# Patient Record
Sex: FEMALE | Race: WHITE | NOT HISPANIC OR LATINO | Employment: OTHER | URBAN - NONMETROPOLITAN AREA
[De-identification: names, ages, dates, MRNs, and addresses within clinical notes are randomized per-mention and may not be internally consistent; named-entity substitution may affect disease eponyms.]

---

## 2023-03-13 DIAGNOSIS — G43.009 MIGRAINE WITHOUT AURA AND WITHOUT STATUS MIGRAINOSUS, NOT INTRACTABLE: Primary | ICD-10-CM

## 2023-03-13 DIAGNOSIS — G43.009 MIGRAINE WITHOUT AURA AND WITHOUT STATUS MIGRAINOSUS, NOT INTRACTABLE: ICD-10-CM

## 2023-03-13 PROBLEM — K27.3 PEPTIC ULCER, ACUTE: Status: ACTIVE | Noted: 2023-03-13

## 2023-03-13 PROBLEM — R31.21 ASYMPTOMATIC MICROSCOPIC HEMATURIA: Status: ACTIVE | Noted: 2023-03-13

## 2023-03-13 PROBLEM — R49.9 VOICE DISTURBANCE: Status: RESOLVED | Noted: 2023-03-13 | Resolved: 2023-03-13

## 2023-03-13 PROBLEM — R07.9 CHEST PAIN: Status: RESOLVED | Noted: 2023-03-13 | Resolved: 2023-03-13

## 2023-03-13 PROBLEM — K29.00 ACUTE GASTRITIS: Status: RESOLVED | Noted: 2023-03-13 | Resolved: 2023-03-13

## 2023-03-13 PROBLEM — M89.9 BONE DISORDER: Status: RESOLVED | Noted: 2023-03-13 | Resolved: 2023-03-13

## 2023-03-13 PROBLEM — R53.83 MALAISE AND FATIGUE: Status: RESOLVED | Noted: 2023-03-13 | Resolved: 2023-03-13

## 2023-03-13 PROBLEM — F33.1 MDD (MAJOR DEPRESSIVE DISORDER), RECURRENT EPISODE, MODERATE (MULTI): Status: ACTIVE | Noted: 2023-03-13

## 2023-03-13 PROBLEM — M79.604 LEG PAIN, BILATERAL: Status: RESOLVED | Noted: 2023-03-13 | Resolved: 2023-03-13

## 2023-03-13 PROBLEM — M79.605 LEG PAIN, BILATERAL: Status: RESOLVED | Noted: 2023-03-13 | Resolved: 2023-03-13

## 2023-03-13 PROBLEM — E55.9 VITAMIN D DEFICIENCY: Status: ACTIVE | Noted: 2023-03-13

## 2023-03-13 PROBLEM — R53.1 WEAKNESS: Status: RESOLVED | Noted: 2023-03-13 | Resolved: 2023-03-13

## 2023-03-13 PROBLEM — M48.02 STENOSIS OF CERVICAL SPINE: Status: ACTIVE | Noted: 2023-03-13

## 2023-03-13 PROBLEM — J30.9 ALLERGIC RHINITIS: Status: ACTIVE | Noted: 2023-03-13

## 2023-03-13 PROBLEM — G35 MULTIPLE SCLEROSIS (MULTI): Status: ACTIVE | Noted: 2023-03-13

## 2023-03-13 PROBLEM — M54.9 BACK PAIN: Status: RESOLVED | Noted: 2023-03-13 | Resolved: 2023-03-13

## 2023-03-13 PROBLEM — D48.5 NEOPLASM OF UNCERTAIN BEHAVIOR OF SKIN OF UPPER EXTREMITY: Status: RESOLVED | Noted: 2023-03-13 | Resolved: 2023-03-13

## 2023-03-13 PROBLEM — M54.12 CERVICAL RADICULAR PAIN: Status: RESOLVED | Noted: 2023-03-13 | Resolved: 2023-03-13

## 2023-03-13 PROBLEM — E88.09 HYPERALBUMINEMIA: Status: ACTIVE | Noted: 2023-03-13

## 2023-03-13 PROBLEM — N32.81 OVERACTIVE BLADDER: Status: ACTIVE | Noted: 2023-03-13

## 2023-03-13 PROBLEM — M54.42 ACUTE RIGHT-SIDED LOW BACK PAIN WITH LEFT-SIDED SCIATICA: Status: RESOLVED | Noted: 2023-03-13 | Resolved: 2023-03-13

## 2023-03-13 PROBLEM — M54.12 CERVICAL RADICULITIS: Status: ACTIVE | Noted: 2023-03-13

## 2023-03-13 PROBLEM — R53.81 MALAISE AND FATIGUE: Status: RESOLVED | Noted: 2023-03-13 | Resolved: 2023-03-13

## 2023-03-13 PROBLEM — I10 HYPERTENSION: Status: ACTIVE | Noted: 2023-03-13

## 2023-03-13 PROBLEM — F51.01 IDIOPATHIC INSOMNIA: Status: ACTIVE | Noted: 2023-03-13

## 2023-03-13 PROBLEM — M70.52 INFRAPATELLAR BURSITIS OF LEFT KNEE: Status: RESOLVED | Noted: 2023-03-13 | Resolved: 2023-03-13

## 2023-03-13 PROBLEM — L98.9 SKIN LESION OF LEFT UPPER EXTREMITY: Status: RESOLVED | Noted: 2023-03-13 | Resolved: 2023-03-13

## 2023-03-13 PROBLEM — M54.50 ACUTE LOW BACK PAIN: Status: RESOLVED | Noted: 2023-03-13 | Resolved: 2023-03-13

## 2023-03-13 RX ORDER — BUTALBITAL, ACETAMINOPHEN AND CAFFEINE 300; 40; 50 MG/1; MG/1; MG/1
1 CAPSULE ORAL EVERY 6 HOURS PRN
Qty: 30 CAPSULE | Refills: 0 | Status: SHIPPED | OUTPATIENT
Start: 2023-03-13 | End: 2023-03-13 | Stop reason: SDUPTHER

## 2023-03-13 RX ORDER — BUTALBITAL, ACETAMINOPHEN AND CAFFEINE 300; 40; 50 MG/1; MG/1; MG/1
1 CAPSULE ORAL EVERY 6 HOURS PRN
Qty: 30 CAPSULE | Refills: 0 | Status: SHIPPED | OUTPATIENT
Start: 2023-03-13 | End: 2023-06-17 | Stop reason: ALTCHOICE

## 2023-03-13 RX ORDER — BUTALBITAL, ACETAMINOPHEN AND CAFFEINE 300; 40; 50 MG/1; MG/1; MG/1
1 CAPSULE ORAL 4 TIMES DAILY
COMMUNITY
Start: 2020-06-24 | End: 2023-03-13 | Stop reason: SDUPTHER

## 2023-03-13 NOTE — PROGRESS NOTES
Subjective   Patient ID: Neema Mckeon is a 57 y.o. female who presents for No chief complaint on file..  HPI    Review of Systems    Objective   Physical Exam    Assessment/Plan

## 2023-03-14 ENCOUNTER — TELEPHONE (OUTPATIENT)
Dept: PRIMARY CARE | Facility: CLINIC | Age: 58
End: 2023-03-14
Payer: MEDICARE

## 2023-03-14 DIAGNOSIS — M54.12 CERVICAL RADICULITIS: Primary | ICD-10-CM

## 2023-03-14 RX ORDER — OXYCODONE AND ACETAMINOPHEN 5; 325 MG/1; MG/1
1 TABLET ORAL EVERY 6 HOURS PRN
Qty: 20 TABLET | Refills: 0 | Status: SHIPPED | OUTPATIENT
Start: 2023-03-14 | End: 2023-05-09 | Stop reason: SDUPTHER

## 2023-03-14 NOTE — TELEPHONE ENCOUNTER
CALLED YESTERDAY FOR PEROCETS, SOMETHING ELSE WAS SENT IN, SHE DONT WANT THAT PLEASE SEND IN THE PEROCETS FOR SHOULDER PAIN

## 2023-03-28 ENCOUNTER — TELEPHONE (OUTPATIENT)
Dept: PRIMARY CARE | Facility: CLINIC | Age: 58
End: 2023-03-28
Payer: MEDICARE

## 2023-03-28 DIAGNOSIS — J01.40 ACUTE NON-RECURRENT PANSINUSITIS: Primary | ICD-10-CM

## 2023-03-28 RX ORDER — AMOXICILLIN AND CLAVULANATE POTASSIUM 875; 125 MG/1; MG/1
875 TABLET, FILM COATED ORAL 2 TIMES DAILY
Qty: 20 TABLET | Refills: 0 | Status: SHIPPED | OUTPATIENT
Start: 2023-03-28 | End: 2024-05-09 | Stop reason: SDUPTHER

## 2023-03-31 DIAGNOSIS — F33.1 MDD (MAJOR DEPRESSIVE DISORDER), RECURRENT EPISODE, MODERATE (MULTI): Primary | ICD-10-CM

## 2023-03-31 RX ORDER — DULOXETIN HYDROCHLORIDE 30 MG/1
CAPSULE, DELAYED RELEASE ORAL
Qty: 90 CAPSULE | Refills: 0 | Status: SHIPPED | OUTPATIENT
Start: 2023-03-31 | End: 2023-06-29

## 2023-04-17 ENCOUNTER — TELEPHONE (OUTPATIENT)
Dept: PRIMARY CARE | Facility: CLINIC | Age: 58
End: 2023-04-17
Payer: MEDICARE

## 2023-04-17 DIAGNOSIS — J01.40 ACUTE NON-RECURRENT PANSINUSITIS: Primary | ICD-10-CM

## 2023-04-17 RX ORDER — DOXYCYCLINE 100 MG/1
100 CAPSULE ORAL 2 TIMES DAILY
COMMUNITY
Start: 2023-02-14 | End: 2023-04-17 | Stop reason: SDUPTHER

## 2023-04-17 RX ORDER — DOXYCYCLINE 100 MG/1
100 CAPSULE ORAL 2 TIMES DAILY
Qty: 20 CAPSULE | Refills: 0 | Status: SHIPPED | OUTPATIENT
Start: 2023-04-17 | End: 2023-04-27 | Stop reason: SINTOL

## 2023-04-27 DIAGNOSIS — J01.40 ACUTE NON-RECURRENT PANSINUSITIS: Primary | ICD-10-CM

## 2023-04-27 RX ORDER — AMOXICILLIN AND CLAVULANATE POTASSIUM 875; 125 MG/1; MG/1
875 TABLET, FILM COATED ORAL 2 TIMES DAILY
Qty: 20 TABLET | Refills: 0 | Status: SHIPPED | OUTPATIENT
Start: 2023-04-27 | End: 2023-07-26 | Stop reason: SDUPTHER

## 2023-04-27 NOTE — PROGRESS NOTES
Subjective   Patient ID: Neema Mckeon is a 58 y.o. female who presents for No chief complaint on file..  HPI    Review of Systems    Objective   Physical Exam    Assessment/Plan

## 2023-05-09 DIAGNOSIS — I10 PRIMARY HYPERTENSION: Primary | ICD-10-CM

## 2023-05-09 DIAGNOSIS — M54.12 CERVICAL RADICULITIS: ICD-10-CM

## 2023-05-09 RX ORDER — OXYCODONE AND ACETAMINOPHEN 5; 325 MG/1; MG/1
1 TABLET ORAL EVERY 6 HOURS PRN
Qty: 20 TABLET | Refills: 0 | Status: SHIPPED | OUTPATIENT
Start: 2023-05-09 | End: 2023-07-03 | Stop reason: SDUPTHER

## 2023-05-09 RX ORDER — LOSARTAN POTASSIUM AND HYDROCHLOROTHIAZIDE 12.5; 1 MG/1; MG/1
TABLET ORAL
Qty: 90 TABLET | Refills: 0 | Status: SHIPPED | OUTPATIENT
Start: 2023-05-09 | End: 2023-09-11

## 2023-05-10 DIAGNOSIS — F51.01 IDIOPATHIC INSOMNIA: Primary | ICD-10-CM

## 2023-05-10 RX ORDER — TRAZODONE HYDROCHLORIDE 50 MG/1
TABLET ORAL
Qty: 60 TABLET | Refills: 0 | Status: SHIPPED | OUTPATIENT
Start: 2023-05-10 | End: 2023-09-11

## 2023-06-17 ENCOUNTER — TELEMEDICINE (OUTPATIENT)
Dept: PRIMARY CARE | Facility: CLINIC | Age: 58
End: 2023-06-17
Payer: MEDICARE

## 2023-06-17 DIAGNOSIS — J01.01 ACUTE RECURRENT MAXILLARY SINUSITIS: Primary | ICD-10-CM

## 2023-06-17 PROCEDURE — 99212 OFFICE O/P EST SF 10 MIN: CPT | Performed by: FAMILY MEDICINE

## 2023-06-17 RX ORDER — AMOXICILLIN AND CLAVULANATE POTASSIUM 875; 125 MG/1; MG/1
875 TABLET, FILM COATED ORAL 2 TIMES DAILY
Qty: 14 TABLET | Refills: 0 | Status: SHIPPED | OUTPATIENT
Start: 2023-06-17 | End: 2023-07-03 | Stop reason: SDUPTHER

## 2023-06-17 NOTE — PROGRESS NOTES
Subjective   Patient ID: Neema Mckeon is a 58 y.o. female who presents for Sinusitis and Headache.    HPI   Telephone visit   Head congestion  PND  Chills  Low garde fever  Face pressure   Thick yellow mucus  + sinusitis  + ST laryngitis  Non smoker   1 week  Flaring MS symptoms   No wheezing or SOB      Review of Systems    Objective   There were no vitals taken for this visit.    Physical Exam    Assessment/Plan   Problem List Items Addressed This Visit    None  Visit Diagnoses       Acute recurrent maxillary sinusitis    -  Primary    Relevant Medications    amoxicillin-pot clavulanate (Augmentin) 875-125 mg tablet

## 2023-06-29 DIAGNOSIS — F33.1 MDD (MAJOR DEPRESSIVE DISORDER), RECURRENT EPISODE, MODERATE (MULTI): ICD-10-CM

## 2023-06-29 RX ORDER — DULOXETIN HYDROCHLORIDE 30 MG/1
CAPSULE, DELAYED RELEASE ORAL
Qty: 90 CAPSULE | Refills: 0 | Status: SHIPPED | OUTPATIENT
Start: 2023-06-29 | End: 2023-11-03 | Stop reason: SDUPTHER

## 2023-07-03 ENCOUNTER — TELEPHONE (OUTPATIENT)
Dept: PRIMARY CARE | Facility: CLINIC | Age: 58
End: 2023-07-03
Payer: MEDICARE

## 2023-07-03 DIAGNOSIS — J01.01 ACUTE RECURRENT MAXILLARY SINUSITIS: ICD-10-CM

## 2023-07-03 DIAGNOSIS — M54.12 CERVICAL RADICULITIS: ICD-10-CM

## 2023-07-03 RX ORDER — AMOXICILLIN AND CLAVULANATE POTASSIUM 875; 125 MG/1; MG/1
875 TABLET, FILM COATED ORAL 2 TIMES DAILY
Qty: 20 TABLET | Refills: 0 | Status: SHIPPED | OUTPATIENT
Start: 2023-07-03 | End: 2023-10-16 | Stop reason: SDUPTHER

## 2023-07-03 RX ORDER — OXYCODONE AND ACETAMINOPHEN 5; 325 MG/1; MG/1
1 TABLET ORAL EVERY 6 HOURS PRN
Qty: 20 TABLET | Refills: 0 | Status: SHIPPED | OUTPATIENT
Start: 2023-07-03 | End: 2023-07-10

## 2023-07-03 NOTE — TELEPHONE ENCOUNTER
CALLING FOR ANOTHER ANTIBIOTIC FOR SINUS, AND SOME PEROCET FOR HER SHOULDER PAIN TO [facility] IN [place]

## 2023-07-25 ENCOUNTER — TELEPHONE (OUTPATIENT)
Dept: PRIMARY CARE | Facility: CLINIC | Age: 58
End: 2023-07-25
Payer: MEDICARE

## 2023-07-26 DIAGNOSIS — J01.40 ACUTE NON-RECURRENT PANSINUSITIS: ICD-10-CM

## 2023-07-26 RX ORDER — AMOXICILLIN AND CLAVULANATE POTASSIUM 875; 125 MG/1; MG/1
875 TABLET, FILM COATED ORAL 2 TIMES DAILY
Qty: 20 TABLET | Refills: 0 | Status: SHIPPED | OUTPATIENT
Start: 2023-07-26 | End: 2023-08-05

## 2023-09-10 DIAGNOSIS — I10 PRIMARY HYPERTENSION: ICD-10-CM

## 2023-09-10 DIAGNOSIS — F51.01 IDIOPATHIC INSOMNIA: ICD-10-CM

## 2023-09-11 ENCOUNTER — OFFICE VISIT (OUTPATIENT)
Dept: PRIMARY CARE | Facility: CLINIC | Age: 58
End: 2023-09-11
Payer: MEDICARE

## 2023-09-11 VITALS
HEIGHT: 63 IN | DIASTOLIC BLOOD PRESSURE: 90 MMHG | SYSTOLIC BLOOD PRESSURE: 134 MMHG | OXYGEN SATURATION: 98 % | WEIGHT: 178 LBS | BODY MASS INDEX: 31.54 KG/M2 | HEART RATE: 97 BPM

## 2023-09-11 DIAGNOSIS — I10 PRIMARY HYPERTENSION: ICD-10-CM

## 2023-09-11 DIAGNOSIS — R10.10 UPPER ABDOMINAL PAIN: ICD-10-CM

## 2023-09-11 DIAGNOSIS — K27.3: Primary | ICD-10-CM

## 2023-09-11 LAB
ALANINE AMINOTRANSFERASE (SGPT) (U/L) IN SER/PLAS: 23 U/L (ref 7–45)
ALBUMIN (G/DL) IN SER/PLAS: 4.8 G/DL (ref 3.4–5)
ALKALINE PHOSPHATASE (U/L) IN SER/PLAS: 115 U/L (ref 33–136)
AMYLASE (U/L) IN SER/PLAS: 64 U/L (ref 29–103)
ANION GAP IN SER/PLAS: 15 MMOL/L (ref 10–20)
ASPARTATE AMINOTRANSFERASE (SGOT) (U/L) IN SER/PLAS: 22 U/L (ref 9–39)
BASOPHILS (10*3/UL) IN BLOOD BY AUTOMATED COUNT: 0.02 X10E9/L (ref 0–0.1)
BASOPHILS/100 LEUKOCYTES IN BLOOD BY AUTOMATED COUNT: 0.2 % (ref 0–2)
BILIRUBIN TOTAL (MG/DL) IN SER/PLAS: 0.7 MG/DL (ref 0–1.2)
CALCIUM (MG/DL) IN SER/PLAS: 9.6 MG/DL (ref 8.6–10.3)
CARBON DIOXIDE, TOTAL (MMOL/L) IN SER/PLAS: 26 MMOL/L (ref 21–32)
CHLORIDE (MMOL/L) IN SER/PLAS: 101 MMOL/L (ref 98–107)
CHOLESTEROL (MG/DL) IN SER/PLAS: 251 MG/DL (ref 0–199)
CHOLESTEROL IN HDL (MG/DL) IN SER/PLAS: 45.2 MG/DL
CHOLESTEROL/HDL RATIO: 5.6
CREATININE (MG/DL) IN SER/PLAS: 0.76 MG/DL (ref 0.5–1.05)
EOSINOPHILS (10*3/UL) IN BLOOD BY AUTOMATED COUNT: 0.16 X10E9/L (ref 0–0.7)
EOSINOPHILS/100 LEUKOCYTES IN BLOOD BY AUTOMATED COUNT: 1.7 % (ref 0–6)
ERYTHROCYTE DISTRIBUTION WIDTH (RATIO) BY AUTOMATED COUNT: 12.3 % (ref 11.5–14.5)
ERYTHROCYTE MEAN CORPUSCULAR HEMOGLOBIN CONCENTRATION (G/DL) BY AUTOMATED: 33.4 G/DL (ref 32–36)
ERYTHROCYTE MEAN CORPUSCULAR VOLUME (FL) BY AUTOMATED COUNT: 90 FL (ref 80–100)
ERYTHROCYTES (10*6/UL) IN BLOOD BY AUTOMATED COUNT: 5.06 X10E12/L (ref 4–5.2)
GFR FEMALE: 89 ML/MIN/1.73M2
GLUCOSE (MG/DL) IN SER/PLAS: 90 MG/DL (ref 74–99)
HEMATOCRIT (%) IN BLOOD BY AUTOMATED COUNT: 45.5 % (ref 36–46)
HEMOGLOBIN (G/DL) IN BLOOD: 15.2 G/DL (ref 12–16)
IMMATURE GRANULOCYTES/100 LEUKOCYTES IN BLOOD BY AUTOMATED COUNT: 0.2 % (ref 0–0.9)
LDL: 171 MG/DL (ref 0–99)
LEUKOCYTES (10*3/UL) IN BLOOD BY AUTOMATED COUNT: 9.5 X10E9/L (ref 4.4–11.3)
LIPASE (U/L) IN SER/PLAS: 98 U/L (ref 9–82)
LYMPHOCYTES (10*3/UL) IN BLOOD BY AUTOMATED COUNT: 3.12 X10E9/L (ref 1.2–4.8)
LYMPHOCYTES/100 LEUKOCYTES IN BLOOD BY AUTOMATED COUNT: 32.8 % (ref 13–44)
MONOCYTES (10*3/UL) IN BLOOD BY AUTOMATED COUNT: 0.8 X10E9/L (ref 0.1–1)
MONOCYTES/100 LEUKOCYTES IN BLOOD BY AUTOMATED COUNT: 8.4 % (ref 2–10)
NEUTROPHILS (10*3/UL) IN BLOOD BY AUTOMATED COUNT: 5.38 X10E9/L (ref 1.2–7.7)
NEUTROPHILS/100 LEUKOCYTES IN BLOOD BY AUTOMATED COUNT: 56.7 % (ref 40–80)
PLATELETS (10*3/UL) IN BLOOD AUTOMATED COUNT: 227 X10E9/L (ref 150–450)
POTASSIUM (MMOL/L) IN SER/PLAS: 3.6 MMOL/L (ref 3.5–5.3)
PROTEIN TOTAL: 6.8 G/DL (ref 6.4–8.2)
SODIUM (MMOL/L) IN SER/PLAS: 138 MMOL/L (ref 136–145)
TRIGLYCERIDE (MG/DL) IN SER/PLAS: 172 MG/DL (ref 0–149)
UREA NITROGEN (MG/DL) IN SER/PLAS: 11 MG/DL (ref 6–23)
VLDL: 34 MG/DL (ref 0–40)

## 2023-09-11 PROCEDURE — 80061 LIPID PANEL: CPT

## 2023-09-11 PROCEDURE — 83690 ASSAY OF LIPASE: CPT

## 2023-09-11 PROCEDURE — 80053 COMPREHEN METABOLIC PANEL: CPT

## 2023-09-11 PROCEDURE — 3075F SYST BP GE 130 - 139MM HG: CPT | Performed by: FAMILY MEDICINE

## 2023-09-11 PROCEDURE — 82150 ASSAY OF AMYLASE: CPT

## 2023-09-11 PROCEDURE — 85025 COMPLETE CBC W/AUTO DIFF WBC: CPT

## 2023-09-11 PROCEDURE — 1036F TOBACCO NON-USER: CPT | Performed by: FAMILY MEDICINE

## 2023-09-11 PROCEDURE — 3080F DIAST BP >= 90 MM HG: CPT | Performed by: FAMILY MEDICINE

## 2023-09-11 PROCEDURE — 99214 OFFICE O/P EST MOD 30 MIN: CPT | Performed by: FAMILY MEDICINE

## 2023-09-11 RX ORDER — PANTOPRAZOLE SODIUM 40 MG/1
40 TABLET, DELAYED RELEASE ORAL DAILY
Qty: 30 TABLET | Refills: 5 | Status: SHIPPED | OUTPATIENT
Start: 2023-09-11 | End: 2023-11-03 | Stop reason: ALTCHOICE

## 2023-09-11 RX ORDER — OXYCODONE AND ACETAMINOPHEN 5; 325 MG/1; MG/1
1 TABLET ORAL EVERY 6 HOURS PRN
Qty: 5 TABLET | Refills: 0 | Status: SHIPPED | OUTPATIENT
Start: 2023-09-11 | End: 2023-09-19 | Stop reason: SDUPTHER

## 2023-09-11 RX ORDER — TRAZODONE HYDROCHLORIDE 50 MG/1
50-150 TABLET ORAL NIGHTLY PRN
Qty: 180 TABLET | Refills: 0 | Status: SHIPPED | OUTPATIENT
Start: 2023-09-11 | End: 2024-01-02

## 2023-09-11 RX ORDER — LOSARTAN POTASSIUM AND HYDROCHLOROTHIAZIDE 12.5; 1 MG/1; MG/1
TABLET ORAL
Qty: 90 TABLET | Refills: 0 | Status: SHIPPED | OUTPATIENT
Start: 2023-09-11 | End: 2024-01-02

## 2023-09-11 RX ORDER — SUCRALFATE 1 G/1
1 TABLET ORAL
Qty: 120 TABLET | Refills: 11 | Status: SHIPPED | OUTPATIENT
Start: 2023-09-11 | End: 2023-09-19 | Stop reason: ALTCHOICE

## 2023-09-11 NOTE — PROGRESS NOTES
Subjective   Patient ID: Neema Mckeon is a 58 y.o. female who presents for Abdominal Pain (Stomach pain after eating, watery diarrhea bloated ).  HPI  This morning after she ate she had severe pain in epigastric area  Pain now to the RUQ  Sharp pain  Took a percocet to get the pain to go away  No history of similar  Nothing seemed to worsen the pain  Feels a little bloated  Has been having diarrhea off and on for the last 2 weeks  No hematochezia, melena  No fever, chills  No n/v  Same stress as always  No CP, palpitations, SOB  No spicy, acidic foods  Not much NSAID's  Low back hurt at the same time the abdominal pain started  PSH- cholecystectomy    Current Outpatient Medications:     DULoxetine (Cymbalta) 30 mg DR capsule, TAKE ONE CAPSULE BY MOUTH DAILY, Disp: 90 capsule, Rfl: 0    losartan-hydrochlorothiazide (Hyzaar) 100-12.5 mg tablet, TAKE ONE TABLET BY MOUTH DAILY, Disp: 90 tablet, Rfl: 0    oxyCODONE-acetaminophen (Percocet) 5-325 mg tablet, Take 1 tablet by mouth every 6 hours if needed for severe pain (7 - 10) for up to 20 days., Disp: 5 tablet, Rfl: 0    pantoprazole (ProtoNix) 40 mg EC tablet, Take 1 tablet (40 mg) by mouth once daily. Do not crush, chew, or split., Disp: 30 tablet, Rfl: 5    sucralfate (Carafate) 1 gram tablet, Take 1 tablet (1 g) by mouth 4 times a day before meals., Disp: 120 tablet, Rfl: 11    traZODone (Desyrel) 50 mg tablet, Take 1-3 tablets ( mg) by mouth as needed at bedtime for sleep., Disp: 180 tablet, Rfl: 0   Past Surgical History:   Procedure Laterality Date    CERVICAL FUSION  05/20/2015    Cervical Vertebral Fusion    CHOLECYSTECTOMY  05/20/2015    Cholecystectomy    HYSTERECTOMY  05/20/2015    Hysterectomy    MR HEAD ANGIO WO IV CONTRAST  10/9/2019    MR HEAD ANGIO WO IV CONTRAST 10/9/2019 [facility]      Past Medical History:   Diagnosis Date    Acute bronchitis due to other specified organisms 09/20/2021    Acute bronchitis due to other specified  "organisms    Acute low back pain 03/13/2023    Acute maxillary sinusitis, unspecified 11/15/2019    Acute maxillary sinusitis    Acute nasopharyngitis (common cold) 11/14/2019    Nasopharyngitis    Acute right-sided low back pain with left-sided sciatica 03/13/2023    Cervical radicular pain 03/13/2023    Chest pain 03/13/2023    Headache, unspecified 06/24/2020    Intractable headache    Infrapatellar bursitis of left knee 03/13/2023    Leg pain, bilateral 03/13/2023    Neoplasm of uncertain behavior of skin of upper extremity 03/13/2023    Other acute sinusitis 02/02/2022    Other acute sinusitis, recurrence not specified    Other psychoactive substance use, 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    Drug-induced insomnia    Otitis media, unspecified, right ear 03/24/2018    Acute right otitis media    Personal history of other diseases of the respiratory system 11/23/2020    History of acute sinusitis    Personal history of other diseases of the respiratory system 11/04/2019    History of sinusitis    Personal history of other diseases of the respiratory system 02/15/2019    History of acute sinusitis    Personal history of other infectious and parasitic diseases 11/12/2020    History of viral infection    Personal history of other specified conditions 03/10/2020    History of epigastric pain    Personal history of other specified conditions 11/25/2019    History of epigastric pain    Skin lesion of left upper extremity 03/13/2023    Voice disturbance 03/13/2023    Weakness 03/13/2023     Social History     Tobacco Use    Smoking status: Never    Smokeless tobacco: Never   Substance Use Topics    Alcohol use: Never    Drug use: Never      No family history on file.   Review of Systems    Objective   /90   Pulse 97   Ht 1.6 m (5' 3\")   Wt 80.7 kg (178 lb)   SpO2 98%   BMI 31.53 kg/m²    Physical Exam  Vitals and nursing note reviewed.   Constitutional:       " Appearance: Normal appearance.   Eyes:      Extraocular Movements: Extraocular movements intact.      Conjunctiva/sclera: Conjunctivae normal.      Pupils: Pupils are equal, round, and reactive to light.   Cardiovascular:      Rate and Rhythm: Normal rate and regular rhythm.      Pulses: Normal pulses.      Heart sounds: Normal heart sounds.   Pulmonary:      Effort: Pulmonary effort is normal.      Breath sounds: Normal breath sounds.   Abdominal:      General: Abdomen is flat. Bowel sounds are normal.      Palpations: Abdomen is soft. There is no shifting dullness.      Tenderness: There is no right CVA tenderness, left CVA tenderness or rebound. Negative signs include Tatum's sign and McBurney's sign.      Hernia: No hernia is present.       Skin:     Capillary Refill: Capillary refill takes less than 2 seconds.   Neurological:      General: No focal deficit present.      Mental Status: She is alert and oriented to person, place, and time.   Psychiatric:         Mood and Affect: Mood normal.         Behavior: Behavior normal.      Comments: Looks uncomfortable         Assessment/Plan   Problem List Items Addressed This Visit       Hypertension    Relevant Orders    Lipid Panel    Peptic ulcer, acute - Primary    Relevant Medications    sucralfate (Carafate) 1 gram tablet    pantoprazole (ProtoNix) 40 mg EC tablet     Other Visit Diagnoses       Upper abdominal pain        Relevant Medications    sucralfate (Carafate) 1 gram tablet    pantoprazole (ProtoNix) 40 mg EC tablet    oxyCODONE-acetaminophen (Percocet) 5-325 mg tablet    Other Relevant Orders    XR abdomen 1 view    CT abdomen pelvis w IV contrast    CBC and Auto Differential    Comprehensive Metabolic Panel    Amylase    Lipase        Check labs  Carafate/omeprazole  Percocet prn  If not improving then need CT STAT    Patient understands and agrees with treatment plan    Kirill Gil DO

## 2023-09-12 ENCOUNTER — TELEPHONE (OUTPATIENT)
Dept: PRIMARY CARE | Facility: CLINIC | Age: 58
End: 2023-09-12
Payer: MEDICARE

## 2023-09-19 ENCOUNTER — TELEPHONE (OUTPATIENT)
Dept: PRIMARY CARE | Facility: CLINIC | Age: 58
End: 2023-09-19

## 2023-09-19 ENCOUNTER — OFFICE VISIT (OUTPATIENT)
Dept: PRIMARY CARE | Facility: CLINIC | Age: 58
End: 2023-09-19
Payer: MEDICARE

## 2023-09-19 ENCOUNTER — LAB (OUTPATIENT)
Dept: LAB | Facility: LAB | Age: 58
End: 2023-09-19
Payer: MEDICARE

## 2023-09-19 VITALS
HEART RATE: 80 BPM | HEIGHT: 63 IN | OXYGEN SATURATION: 98 % | DIASTOLIC BLOOD PRESSURE: 72 MMHG | SYSTOLIC BLOOD PRESSURE: 128 MMHG | WEIGHT: 178 LBS | BODY MASS INDEX: 31.54 KG/M2

## 2023-09-19 DIAGNOSIS — R10.10 UPPER ABDOMINAL PAIN: ICD-10-CM

## 2023-09-19 DIAGNOSIS — A09 DIARRHEA OF INFECTIOUS ORIGIN: ICD-10-CM

## 2023-09-19 DIAGNOSIS — K52.9 COLITIS: Primary | ICD-10-CM

## 2023-09-19 PROCEDURE — 3078F DIAST BP <80 MM HG: CPT | Performed by: FAMILY MEDICINE

## 2023-09-19 PROCEDURE — 87177 OVA AND PARASITES SMEARS: CPT

## 2023-09-19 PROCEDURE — 99213 OFFICE O/P EST LOW 20 MIN: CPT | Performed by: FAMILY MEDICINE

## 2023-09-19 PROCEDURE — 87493 C DIFF AMPLIFIED PROBE: CPT

## 2023-09-19 PROCEDURE — 3074F SYST BP LT 130 MM HG: CPT | Performed by: FAMILY MEDICINE

## 2023-09-19 PROCEDURE — 87328 CRYPTOSPORIDIUM AG IA: CPT

## 2023-09-19 PROCEDURE — 1036F TOBACCO NON-USER: CPT | Performed by: FAMILY MEDICINE

## 2023-09-19 PROCEDURE — 87506 IADNA-DNA/RNA PROBE TQ 6-11: CPT

## 2023-09-19 PROCEDURE — 87329 GIARDIA AG IA: CPT

## 2023-09-19 RX ORDER — OXYCODONE AND ACETAMINOPHEN 5; 325 MG/1; MG/1
1 TABLET ORAL EVERY 6 HOURS PRN
Qty: 12 TABLET | Refills: 0 | Status: SHIPPED | OUTPATIENT
Start: 2023-09-19 | End: 2023-11-18 | Stop reason: SDUPTHER

## 2023-09-19 RX ORDER — AZITHROMYCIN 500 MG/1
500 TABLET, FILM COATED ORAL DAILY
Qty: 5 TABLET | Refills: 0 | Status: SHIPPED | OUTPATIENT
Start: 2023-09-19 | End: 2023-09-24

## 2023-09-19 RX ORDER — OXYCODONE AND ACETAMINOPHEN 5; 325 MG/1; MG/1
1 TABLET ORAL EVERY 6 HOURS PRN
Qty: 5 TABLET | Refills: 0 | Status: SHIPPED | OUTPATIENT
Start: 2023-09-19 | End: 2023-09-19 | Stop reason: SDUPTHER

## 2023-09-19 NOTE — PROGRESS NOTES
Subjective   Patient ID: Neema Mckeon is a 58 y.o. female who presents for Bloated (Bloating and diarrhea ).  HPI  Still having bloating and diarrhea  Very uncomfortable  TTP in upper abdomen and RLQ  No n/v, fever, chills  No hematochezia, melena  No dysuria, hematuria, frequency  No CP, SOB, palpitations    No sick contacts  Last antibiotic in July  Has chickens and turkeys    Current Outpatient Medications:     DULoxetine (Cymbalta) 30 mg DR capsule, TAKE ONE CAPSULE BY MOUTH DAILY, Disp: 90 capsule, Rfl: 0    losartan-hydrochlorothiazide (Hyzaar) 100-12.5 mg tablet, TAKE ONE TABLET BY MOUTH DAILY, Disp: 90 tablet, Rfl: 0    pantoprazole (ProtoNix) 40 mg EC tablet, Take 1 tablet (40 mg) by mouth once daily. Do not crush, chew, or split., Disp: 30 tablet, Rfl: 5    traZODone (Desyrel) 50 mg tablet, Take 1-3 tablets ( mg) by mouth as needed at bedtime for sleep., Disp: 180 tablet, Rfl: 0    azithromycin (Zithromax) 500 mg tablet, Take 1 tablet (500 mg) by mouth once daily for 5 days., Disp: 5 tablet, Rfl: 0    oxyCODONE-acetaminophen (Percocet) 5-325 mg tablet, Take 1 tablet by mouth every 6 hours if needed for severe pain (7 - 10) for up to 5 days., Disp: 12 tablet, Rfl: 0   Past Surgical History:   Procedure Laterality Date    CERVICAL FUSION  05/20/2015    Cervical Vertebral Fusion    CHOLECYSTECTOMY  05/20/2015    Cholecystectomy    CT ABDOMEN PELVIS ANGIOGRAM W AND/OR WO IV CONTRAST  9/13/2023    CT ABDOMEN PELVIS ANGIOGRAM W AND/OR WO IV CONTRAST 9/13/2023 [id number] CT    HYSTERECTOMY  05/20/2015    Hysterectomy    MR HEAD ANGIO WO IV CONTRAST  10/9/2019    MR HEAD ANGIO WO IV CONTRAST 10/9/2019 [facility]      Past Medical History:   Diagnosis Date    Acute bronchitis due to other specified organisms 09/20/2021    Acute bronchitis due to other specified organisms    Acute low back pain 03/13/2023    Acute maxillary sinusitis, unspecified 11/15/2019    Acute maxillary sinusitis    Acute  "nasopharyngitis (common cold) 11/14/2019    Nasopharyngitis    Acute right-sided low back pain with left-sided sciatica 03/13/2023    Cervical radicular pain 03/13/2023    Chest pain 03/13/2023    Headache, unspecified 06/24/2020    Intractable headache    Infrapatellar bursitis of left knee 03/13/2023    Leg pain, bilateral 03/13/2023    Neoplasm of uncertain behavior of skin of upper extremity 03/13/2023    Other acute sinusitis 02/02/2022    Other acute sinusitis, recurrence not specified    Other psychoactive substance use, 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    Drug-induced insomnia    Otitis media, unspecified, right ear 03/24/2018    Acute right otitis media    Personal history of other diseases of the respiratory system 11/23/2020    History of acute sinusitis    Personal history of other diseases of the respiratory system 11/04/2019    History of sinusitis    Personal history of other diseases of the respiratory system 02/15/2019    History of acute sinusitis    Personal history of other infectious and parasitic diseases 11/12/2020    History of viral infection    Personal history of other specified conditions 03/10/2020    History of epigastric pain    Personal history of other specified conditions 11/25/2019    History of epigastric pain    Skin lesion of left upper extremity 03/13/2023    Voice disturbance 03/13/2023    Weakness 03/13/2023     Social History     Tobacco Use    Smoking status: Never    Smokeless tobacco: Never   Substance Use Topics    Alcohol use: Never    Drug use: Never      No family history on file.   Review of Systems    Objective   /72   Pulse 80   Ht 1.6 m (5' 3\")   Wt 80.7 kg (178 lb)   SpO2 98%   BMI 31.53 kg/m²    Physical Exam  Vitals and nursing note reviewed.   Constitutional:       General: She is not in acute distress.     Appearance: Normal appearance. She is not ill-appearing.   Eyes:      Extraocular Movements: " Extraocular movements intact.      Conjunctiva/sclera: Conjunctivae normal.      Pupils: Pupils are equal, round, and reactive to light.   Cardiovascular:      Rate and Rhythm: Normal rate and regular rhythm.      Pulses: Normal pulses.      Heart sounds: Normal heart sounds.   Pulmonary:      Effort: Pulmonary effort is normal.      Breath sounds: Normal breath sounds.   Abdominal:      General: Bowel sounds are normal. There is distension.      Palpations: Abdomen is soft.      Tenderness: There is generalized abdominal tenderness. There is no right CVA tenderness or left CVA tenderness. Negative signs include Tatum's sign and McBurney's sign.      Hernia: No hernia is present.   Skin:     Capillary Refill: Capillary refill takes less than 2 seconds.   Neurological:      Mental Status: She is alert.   Psychiatric:         Mood and Affect: Mood normal.         Behavior: Behavior normal.          Assessment/Plan   Problem List Items Addressed This Visit    None  Visit Diagnoses       Colitis    -  Primary    Relevant Medications    azithromycin (Zithromax) 500 mg tablet    Other Relevant Orders    Stool Pathogen Panel, PCR    C. difficile, PCR    Ova/Para + Giardia/Cryptosporidium Antigen    Diarrhea of infectious origin        Relevant Orders    Stool Pathogen Panel, PCR    Upper abdominal pain            Suspect Campylobacter infection with chicken exposure  Check stool test  Azithromycin to treat    Patient understands and agrees with treatment plan    Kirill Gil, DO

## 2023-09-19 NOTE — TELEPHONE ENCOUNTER
PT called because [facility] in [place] told her they will not fill her pain meds for another 20 days because of the way you wrote the last pain meds rx.  She would like you to call and fix the issue so she can get her pain meds.

## 2023-09-20 LAB
C. DIFFICILE TOXIN, PCR: NOT DETECTED
CAMPYLOBACTER GP: NOT DETECTED
NOROVIRUS GI/GII: NOT DETECTED
ROTAVIRUS A: NOT DETECTED
SALMONELLA SP.: NOT DETECTED
SHIGA TOXIN 1: NOT DETECTED
SHIGA TOXIN 2: NOT DETECTED
SHIGELLA SP.: NOT DETECTED
VIBRIO GRP.: NOT DETECTED
YERSINIA ENTEROCOLITICA: NOT DETECTED

## 2023-09-27 LAB
CRYPTOSPORIDIUM ANTIGEN-DATA CONVERSION: NEGATIVE
GIARDIA LAMBLIA AG-DATA CONVERSION: NEGATIVE
OVA + PARASITE EXAM: NEGATIVE

## 2023-10-16 ENCOUNTER — TELEPHONE (OUTPATIENT)
Dept: PRIMARY CARE | Facility: CLINIC | Age: 58
End: 2023-10-16
Payer: MEDICARE

## 2023-10-16 DIAGNOSIS — K52.9 COLITIS: ICD-10-CM

## 2023-10-16 DIAGNOSIS — J01.01 ACUTE RECURRENT MAXILLARY SINUSITIS: ICD-10-CM

## 2023-10-16 RX ORDER — AMOXICILLIN AND CLAVULANATE POTASSIUM 875; 125 MG/1; MG/1
875 TABLET, FILM COATED ORAL 2 TIMES DAILY
Qty: 20 TABLET | Refills: 0 | Status: SHIPPED | OUTPATIENT
Start: 2023-10-16 | End: 2023-10-26

## 2023-11-02 ENCOUNTER — TELEPHONE (OUTPATIENT)
Dept: PRIMARY CARE | Facility: CLINIC | Age: 58
End: 2023-11-02
Payer: MEDICARE

## 2023-11-03 ENCOUNTER — OFFICE VISIT (OUTPATIENT)
Dept: PRIMARY CARE | Facility: CLINIC | Age: 58
End: 2023-11-03
Payer: MEDICARE

## 2023-11-03 VITALS
WEIGHT: 181 LBS | HEIGHT: 63 IN | SYSTOLIC BLOOD PRESSURE: 136 MMHG | DIASTOLIC BLOOD PRESSURE: 80 MMHG | BODY MASS INDEX: 32.07 KG/M2 | OXYGEN SATURATION: 98 % | HEART RATE: 102 BPM

## 2023-11-03 DIAGNOSIS — F33.1 MDD (MAJOR DEPRESSIVE DISORDER), RECURRENT EPISODE, MODERATE (MULTI): ICD-10-CM

## 2023-11-03 DIAGNOSIS — G35 MULTIPLE SCLEROSIS (MULTI): ICD-10-CM

## 2023-11-03 DIAGNOSIS — J01.80 ACUTE NON-RECURRENT SINUSITIS OF OTHER SINUS: Primary | ICD-10-CM

## 2023-11-03 PROCEDURE — 99214 OFFICE O/P EST MOD 30 MIN: CPT | Performed by: FAMILY MEDICINE

## 2023-11-03 PROCEDURE — 3075F SYST BP GE 130 - 139MM HG: CPT | Performed by: FAMILY MEDICINE

## 2023-11-03 PROCEDURE — 1036F TOBACCO NON-USER: CPT | Performed by: FAMILY MEDICINE

## 2023-11-03 PROCEDURE — 3079F DIAST BP 80-89 MM HG: CPT | Performed by: FAMILY MEDICINE

## 2023-11-03 RX ORDER — AMOXICILLIN AND CLAVULANATE POTASSIUM 875; 125 MG/1; MG/1
875 TABLET, FILM COATED ORAL 2 TIMES DAILY
Qty: 20 TABLET | Refills: 0 | Status: SHIPPED | OUTPATIENT
Start: 2023-11-03 | End: 2023-12-08 | Stop reason: SDUPTHER

## 2023-11-03 RX ORDER — PREDNISONE 10 MG/1
10 TABLET ORAL DAILY
Qty: 30 TABLET | Refills: 0 | Status: SHIPPED | OUTPATIENT
Start: 2023-11-03 | End: 2023-12-04 | Stop reason: ALTCHOICE

## 2023-11-03 RX ORDER — DULOXETIN HYDROCHLORIDE 30 MG/1
30 CAPSULE, DELAYED RELEASE ORAL DAILY
Qty: 90 CAPSULE | Refills: 1 | Status: SHIPPED | OUTPATIENT
Start: 2023-11-03 | End: 2024-06-03

## 2023-11-03 NOTE — PROGRESS NOTES
Subjective   Patient ID: Neema Mckeon is a 58 y.o. female who presents for Sinusitis (Sinus infection, migraine and eye pain ).  HPI  Has been feeling sick for about 1 week  + nasal congestion  + sweats  No chills, ST  Feels like neck swollen  Right ear pain  No CP, SOB  Some coughing  Slight nausea  No vomiting, diarrhea    Feels like MS is flared up  Nagging pain all over  Right eye is blurring up on the right and stabbing pain in it  + urinary incontinence      Current Outpatient Medications:     losartan-hydrochlorothiazide (Hyzaar) 100-12.5 mg tablet, TAKE ONE TABLET BY MOUTH DAILY, Disp: 90 tablet, Rfl: 0    traZODone (Desyrel) 50 mg tablet, Take 1-3 tablets ( mg) by mouth as needed at bedtime for sleep., Disp: 180 tablet, Rfl: 0    amoxicillin-pot clavulanate (Augmentin) 875-125 mg tablet, Take 1 tablet (875 mg) by mouth 2 times a day for 10 days., Disp: 20 tablet, Rfl: 0    DULoxetine (Cymbalta) 30 mg DR capsule, Take 1 capsule (30 mg) by mouth once daily. Do not crush or chew., Disp: 90 capsule, Rfl: 1    predniSONE (Deltasone) 10 mg tablet, Take 1 tablet (10 mg) by mouth once daily., Disp: 30 tablet, Rfl: 0   Past Surgical History:   Procedure Laterality Date    CERVICAL FUSION  05/20/2015    Cervical Vertebral Fusion    CHOLECYSTECTOMY  05/20/2015    Cholecystectomy    CT ABDOMEN PELVIS ANGIOGRAM W AND/OR WO IV CONTRAST  9/13/2023    CT ABDOMEN PELVIS ANGIOGRAM W AND/OR WO IV CONTRAST 9/13/2023 [id number] CT    HYSTERECTOMY  05/20/2015    Hysterectomy    MR HEAD ANGIO WO IV CONTRAST  10/9/2019    MR HEAD ANGIO WO IV CONTRAST 10/9/2019 [facility]      Past Medical History:   Diagnosis Date    Acute bronchitis due to other specified organisms 09/20/2021    Acute bronchitis due to other specified organisms    Acute low back pain 03/13/2023    Acute maxillary sinusitis, unspecified 11/15/2019    Acute maxillary sinusitis    Acute nasopharyngitis (common cold) 11/14/2019    Nasopharyngitis  "   Acute right-sided low back pain with left-sided sciatica 03/13/2023    Cervical radicular pain 03/13/2023    Chest pain 03/13/2023    Headache, unspecified 06/24/2020    Intractable headache    Infrapatellar bursitis of left knee 03/13/2023    Leg pain, bilateral 03/13/2023    Neoplasm of uncertain behavior of skin of upper extremity 03/13/2023    Other acute sinusitis 02/02/2022    Other acute sinusitis, recurrence not specified    Other psychoactive substance use, 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    Drug-induced insomnia    Otitis media, unspecified, right ear 03/24/2018    Acute right otitis media    Personal history of other diseases of the respiratory system 11/23/2020    History of acute sinusitis    Personal history of other diseases of the respiratory system 11/04/2019    History of sinusitis    Personal history of other diseases of the respiratory system 02/15/2019    History of acute sinusitis    Personal history of other infectious and parasitic diseases 11/12/2020    History of viral infection    Personal history of other specified conditions 03/10/2020    History of epigastric pain    Personal history of other specified conditions 11/25/2019    History of epigastric pain    Skin lesion of left upper extremity 03/13/2023    Voice disturbance 03/13/2023    Weakness 03/13/2023     Social History     Tobacco Use    Smoking status: Never    Smokeless tobacco: Never   Substance Use Topics    Alcohol use: Never    Drug use: Never      No family history on file.   Review of Systems    Objective   /80   Pulse 102   Ht 1.6 m (5' 3\")   Wt 82.1 kg (181 lb)   SpO2 98%   BMI 32.06 kg/m²    Physical Exam  Constitutional:       General: She is not in acute distress.     Appearance: Normal appearance. She is not ill-appearing.   HENT:      Head: Normocephalic and atraumatic.      Right Ear: Tympanic membrane, ear canal and external ear normal.      Left " Ear: Tympanic membrane, ear canal and external ear normal.      Nose: Congestion present.      Mouth/Throat:      Mouth: Mucous membranes are moist.      Pharynx: Oropharynx is clear. No oropharyngeal exudate or posterior oropharyngeal erythema.   Eyes:      Extraocular Movements: Extraocular movements intact.      Conjunctiva/sclera: Conjunctivae normal.      Pupils: Pupils are equal, round, and reactive to light.   Neck:      Vascular: No carotid bruit.   Cardiovascular:      Rate and Rhythm: Normal rate and regular rhythm.      Pulses: Normal pulses.      Heart sounds: Normal heart sounds.   Pulmonary:      Effort: Pulmonary effort is normal.      Breath sounds: Normal breath sounds. No wheezing, rhonchi or rales.   Abdominal:      General: Abdomen is flat. Bowel sounds are normal.      Palpations: Abdomen is soft.   Musculoskeletal:      Cervical back: Normal range of motion and neck supple.   Lymphadenopathy:      Cervical: No cervical adenopathy.   Skin:     Capillary Refill: Capillary refill takes less than 2 seconds.   Neurological:      General: No focal deficit present.      Mental Status: She is alert and oriented to person, place, and time.   Psychiatric:         Mood and Affect: Mood normal.     Assessment/Plan   Problem List Items Addressed This Visit       MDD (major depressive disorder), recurrent episode, moderate (CMS/HCC)    Relevant Medications    DULoxetine (Cymbalta) 30 mg DR capsule    Multiple sclerosis (CMS/HCC)    Relevant Medications    predniSONE (Deltasone) 10 mg tablet    Other Relevant Orders    Disability Placard     Other Visit Diagnoses       Acute non-recurrent sinusitis of other sinus    -  Primary    Relevant Medications    amoxicillin-pot clavulanate (Augmentin) 875-125 mg tablet        MS-flair- prednisone, decrease stress    Sinis- augmentin, fluids, rest, OTC cold meds    Told parent/patient that if no improvement in 2-3 days then please call. If worsens or new symptoms occur  then please call when this occurs. If worsening then go to ER immediately      Patient understands and agrees with treatment plan    Kirill Gil, DO

## 2023-11-18 ENCOUNTER — TELEPHONE (OUTPATIENT)
Dept: PRIMARY CARE | Facility: CLINIC | Age: 58
End: 2023-11-18
Payer: MEDICARE

## 2023-11-18 DIAGNOSIS — R10.10 UPPER ABDOMINAL PAIN: ICD-10-CM

## 2023-11-18 RX ORDER — OXYCODONE AND ACETAMINOPHEN 5; 325 MG/1; MG/1
1 TABLET ORAL EVERY 6 HOURS PRN
Qty: 12 TABLET | Refills: 0 | Status: SHIPPED | OUTPATIENT
Start: 2023-11-18 | End: 2023-12-29 | Stop reason: SDUPTHER

## 2023-12-04 ENCOUNTER — OFFICE VISIT (OUTPATIENT)
Dept: PRIMARY CARE | Facility: CLINIC | Age: 58
End: 2023-12-04
Payer: MEDICARE

## 2023-12-04 VITALS
DIASTOLIC BLOOD PRESSURE: 80 MMHG | SYSTOLIC BLOOD PRESSURE: 132 MMHG | HEART RATE: 78 BPM | OXYGEN SATURATION: 98 % | WEIGHT: 181 LBS | HEIGHT: 63 IN | BODY MASS INDEX: 32.07 KG/M2

## 2023-12-04 DIAGNOSIS — Z12.12 SCREENING FOR COLORECTAL CANCER: ICD-10-CM

## 2023-12-04 DIAGNOSIS — Z00.00 ROUTINE GENERAL MEDICAL EXAMINATION AT HEALTH CARE FACILITY: Primary | ICD-10-CM

## 2023-12-04 DIAGNOSIS — G35 MULTIPLE SCLEROSIS (MULTI): ICD-10-CM

## 2023-12-04 DIAGNOSIS — I10 PRIMARY HYPERTENSION: ICD-10-CM

## 2023-12-04 DIAGNOSIS — Z71.89 CARDIAC RISK COUNSELING: ICD-10-CM

## 2023-12-04 DIAGNOSIS — J30.1 NON-SEASONAL ALLERGIC RHINITIS DUE TO POLLEN: ICD-10-CM

## 2023-12-04 DIAGNOSIS — E55.9 VITAMIN D DEFICIENCY: ICD-10-CM

## 2023-12-04 DIAGNOSIS — Z12.31 ENCOUNTER FOR SCREENING MAMMOGRAM FOR BREAST CANCER: ICD-10-CM

## 2023-12-04 DIAGNOSIS — F33.1 MDD (MAJOR DEPRESSIVE DISORDER), RECURRENT EPISODE, MODERATE (MULTI): ICD-10-CM

## 2023-12-04 DIAGNOSIS — Z12.11 SCREENING FOR COLORECTAL CANCER: ICD-10-CM

## 2023-12-04 DIAGNOSIS — Z13.89 ENCOUNTER FOR SCREENING FOR OTHER DISORDER: ICD-10-CM

## 2023-12-04 DIAGNOSIS — F51.01 IDIOPATHIC INSOMNIA: ICD-10-CM

## 2023-12-04 DIAGNOSIS — G43.009 MIGRAINE WITHOUT AURA AND WITHOUT STATUS MIGRAINOSUS, NOT INTRACTABLE: ICD-10-CM

## 2023-12-04 PROCEDURE — 99396 PREV VISIT EST AGE 40-64: CPT | Performed by: FAMILY MEDICINE

## 2023-12-04 PROCEDURE — 90686 IIV4 VACC NO PRSV 0.5 ML IM: CPT | Performed by: FAMILY MEDICINE

## 2023-12-04 PROCEDURE — 1036F TOBACCO NON-USER: CPT | Performed by: FAMILY MEDICINE

## 2023-12-04 PROCEDURE — G0438 PPPS, INITIAL VISIT: HCPCS | Performed by: FAMILY MEDICINE

## 2023-12-04 PROCEDURE — G0442 ANNUAL ALCOHOL SCREEN 15 MIN: HCPCS | Performed by: FAMILY MEDICINE

## 2023-12-04 PROCEDURE — 99214 OFFICE O/P EST MOD 30 MIN: CPT | Performed by: FAMILY MEDICINE

## 2023-12-04 PROCEDURE — 3075F SYST BP GE 130 - 139MM HG: CPT | Performed by: FAMILY MEDICINE

## 2023-12-04 PROCEDURE — G0008 ADMIN INFLUENZA VIRUS VAC: HCPCS | Performed by: FAMILY MEDICINE

## 2023-12-04 PROCEDURE — G0446 INTENS BEHAVE THER CARDIO DX: HCPCS | Performed by: FAMILY MEDICINE

## 2023-12-04 PROCEDURE — 3079F DIAST BP 80-89 MM HG: CPT | Performed by: FAMILY MEDICINE

## 2023-12-04 RX ORDER — CETIRIZINE HYDROCHLORIDE 10 MG/1
10 TABLET ORAL DAILY
Qty: 30 TABLET | Refills: 5 | Status: SHIPPED | OUTPATIENT
Start: 2023-12-04 | End: 2024-06-01

## 2023-12-04 ASSESSMENT — ENCOUNTER SYMPTOMS
SHORTNESS OF BREATH: 0
CHILLS: 1
COUGH: 1
TREMORS: 0
SORE THROAT: 0
CHEST TIGHTNESS: 0
EYE PAIN: 0
BLOOD IN STOOL: 0
DYSURIA: 0
NUMBNESS: 0
WHEEZING: 1
DYSPHORIC MOOD: 0
FEVER: 0
CONSTIPATION: 0
TROUBLE SWALLOWING: 0
VOMITING: 0
POLYDIPSIA: 0
ARTHRALGIAS: 0
COLOR CHANGE: 0
HEMATURIA: 0
HEADACHES: 0
DEPRESSION: 0
EYE REDNESS: 0
DIZZINESS: 0
LOSS OF SENSATION IN FEET: 0
BRUISES/BLEEDS EASILY: 0
ABDOMINAL PAIN: 0
POLYPHAGIA: 0
WEAKNESS: 0
ADENOPATHY: 0
PALPITATIONS: 0
FATIGUE: 0
FREQUENCY: 0
DIARRHEA: 0
NAUSEA: 0
BACK PAIN: 0
NERVOUS/ANXIOUS: 0
OCCASIONAL FEELINGS OF UNSTEADINESS: 0

## 2023-12-04 ASSESSMENT — PATIENT HEALTH QUESTIONNAIRE - PHQ9
2. FEELING DOWN, DEPRESSED OR HOPELESS: NOT AT ALL
SUM OF ALL RESPONSES TO PHQ9 QUESTIONS 1 AND 2: 0
1. LITTLE INTEREST OR PLEASURE IN DOING THINGS: NOT AT ALL

## 2023-12-04 ASSESSMENT — LIFESTYLE VARIABLES
HOW MANY STANDARD DRINKS CONTAINING ALCOHOL DO YOU HAVE ON A TYPICAL DAY: PATIENT DOES NOT DRINK
HOW OFTEN DO YOU HAVE A DRINK CONTAINING ALCOHOL: NEVER
HOW OFTEN DO YOU HAVE SIX OR MORE DRINKS ON ONE OCCASION: NEVER
AUDIT-C TOTAL SCORE: 0
SKIP TO QUESTIONS 9-10: 1
AUDIT-C TOTAL SCORE: 0

## 2023-12-04 ASSESSMENT — ACTIVITIES OF DAILY LIVING (ADL)
MANAGING_FINANCES: INDEPENDENT
BATHING: INDEPENDENT
DOING_HOUSEWORK: INDEPENDENT
DRESSING: INDEPENDENT
GROCERY_SHOPPING: INDEPENDENT
TAKING_MEDICATION: INDEPENDENT

## 2023-12-04 NOTE — PROGRESS NOTES
Subjective   Reason for Visit: Neema Mckeon is an 58 y.o. female here for a Medicare Wellness visit.     Past Medical, Surgical, and Family History reviewed and updated in chart.    Reviewed all medications by prescribing practitioner or clinical pharmacist (such as prescriptions, OTCs, herbal therapies and supplements) and documented in the medical record.    HPI  Has a cough that comes and goes  Coughs until almost vomits  Not sure what triggers it  Occasionally will bring up white mucus  Some SOB when can not stop coughing  Has some PND but no runny nose  Some wheezing occasionally  No GERD, fever  Occasional chills  No CP  + sneezing    Ophtho- 2021  Dentist- been awhile  [facility]-  Colonoscopy-  DEION-  Cologuard- ordered  UA/Micro-  Mammo- ordered  DEXA-  PAP- due  Lung CT-  Coronary Calcium CT Score-  AAA-  EKG-  Prevnar-  Flu- refused  Shingrix-  Td-  Hep C-  Advance Directives-  ETOH screen- 12/4/23  CV risk- 12/4/23      Patient Care Team:  Kirill Gil DO as PCP - General  Kirill Gil DO as PCP - [employer] PCP  DO Veronica Sagastume MD as Referring Physician (Neurology)     Review of Systems   Constitutional:  Positive for chills. Negative for fatigue and fever.   HENT:  Positive for sneezing. Negative for congestion, ear discharge, ear pain, hearing loss, nosebleeds, sore throat, tinnitus and trouble swallowing.    Eyes:  Negative for pain, redness and visual disturbance.   Respiratory:  Positive for cough and wheezing. Negative for chest tightness and shortness of breath.    Cardiovascular:  Negative for chest pain, palpitations and leg swelling.   Gastrointestinal:  Negative for abdominal pain, blood in stool, constipation, diarrhea, nausea and vomiting.   Endocrine: Negative for cold intolerance, heat intolerance, polydipsia, polyphagia and polyuria.   Genitourinary:  Negative for dysuria, frequency, hematuria and urgency.   Musculoskeletal:  Negative for arthralgias,  "back pain and gait problem.   Skin:  Negative for color change and rash.   Neurological:  Negative for dizziness, tremors, syncope, weakness, numbness and headaches.   Hematological:  Negative for adenopathy. Does not bruise/bleed easily.   Psychiatric/Behavioral:  Negative for dysphoric mood. The patient is not nervous/anxious.        Objective   Vitals:  /80   Pulse 78   Ht 1.6 m (5' 3\")   Wt 82.1 kg (181 lb)   SpO2 98%   BMI 32.06 kg/m²       Physical Exam  Vitals and nursing note reviewed.   Constitutional:       General: She is not in acute distress.     Appearance: Normal appearance. She is not ill-appearing.   HENT:      Head: Normocephalic and atraumatic.      Right Ear: Tympanic membrane, ear canal and external ear normal.      Left Ear: Tympanic membrane, ear canal and external ear normal.      Nose: Nose normal.      Mouth/Throat:      Mouth: Mucous membranes are dry.      Pharynx: Oropharynx is clear.   Eyes:      Extraocular Movements: Extraocular movements intact.      Conjunctiva/sclera: Conjunctivae normal.      Pupils: Pupils are equal, round, and reactive to light.   Neck:      Vascular: No carotid bruit.   Cardiovascular:      Rate and Rhythm: Normal rate and regular rhythm.      Pulses: Normal pulses.      Heart sounds: Normal heart sounds.   Pulmonary:      Effort: Pulmonary effort is normal.      Breath sounds: Normal breath sounds.   Abdominal:      General: Abdomen is flat. Bowel sounds are normal. There is no distension.      Palpations: Abdomen is soft.      Tenderness: There is no abdominal tenderness. There is no right CVA tenderness or left CVA tenderness. Negative signs include Tatum's sign and McBurney's sign.      Hernia: No hernia is present.   Musculoskeletal:         General: Normal range of motion.      Cervical back: Normal range of motion and neck supple.   Lymphadenopathy:      Cervical: No cervical adenopathy.   Skin:     Capillary Refill: Capillary refill takes " less than 2 seconds.   Neurological:      General: No focal deficit present.      Mental Status: She is alert and oriented to person, place, and time.   Psychiatric:         Mood and Affect: Mood normal.         Behavior: Behavior normal.         Assessment/Plan   Problem List Items Addressed This Visit       Allergic rhinitis    Relevant Medications    cetirizine (ZyrTEC) 10 mg tablet    Idiopathic insomnia    MDD (major depressive disorder), recurrent episode, moderate (CMS/HCC)    Migraine without aura and without status migrainosus, not intractable    Multiple sclerosis (CMS/HCC)    Primary hypertension    Vitamin D deficiency     Other Visit Diagnoses       Routine general medical examination at health care facility    -  Primary    Encounter for screening for other disorder [Z13.89]        Cardiac risk counseling [Z71.89]        Screening for colorectal cancer        Relevant Orders    Cologuard® colon cancer screening    Encounter for screening mammogram for breast cancer        Relevant Orders    BI mammo bilateral screening tomosynthesis        Renewed/continued rest of medications  Checked  labs  Updated Health Maintenance in HPI section  HTN, controlled- continue meds, low sodium diet    Obesity- caloric restriction, increase CV exercise    AR- zyrtec, avoid allergens    MDD- cymbalta, CV exercise    MS- prednisone prn    Migraine- avoid triggers, OTC meds prn    Vitamin D Def- follow level, supplement    Insomnia- sleep hygiene, trazodone        Cardiovascular risk discussed and, if needed, lifestyle modifications recommended, including nutritional choices, exercise, and elimination of habits contributing to risk. We agreed on a plan to reduce the current cardiovascular risk. See the ASCVD Risk  Plus (6.4%) for data discussed regarding risk and risk reduction opportunities. Aspirin use/disuse was discussed after reviewing updated guidelines

## 2023-12-08 ENCOUNTER — TELEPHONE (OUTPATIENT)
Dept: PRIMARY CARE | Facility: CLINIC | Age: 58
End: 2023-12-08
Payer: MEDICARE

## 2023-12-08 DIAGNOSIS — J01.80 ACUTE NON-RECURRENT SINUSITIS OF OTHER SINUS: ICD-10-CM

## 2023-12-08 RX ORDER — AMOXICILLIN AND CLAVULANATE POTASSIUM 875; 125 MG/1; MG/1
875 TABLET, FILM COATED ORAL 2 TIMES DAILY
Qty: 20 TABLET | Refills: 0 | Status: SHIPPED | OUTPATIENT
Start: 2023-12-08 | End: 2024-01-08 | Stop reason: SDUPTHER

## 2023-12-28 ENCOUNTER — TELEPHONE (OUTPATIENT)
Dept: PRIMARY CARE | Facility: CLINIC | Age: 58
End: 2023-12-28
Payer: MEDICARE

## 2023-12-28 NOTE — TELEPHONE ENCOUNTER
Asked if you would send in percocet? She is having a lot of back pain and its hard to walk right now

## 2023-12-29 DIAGNOSIS — R10.10 UPPER ABDOMINAL PAIN: ICD-10-CM

## 2023-12-29 RX ORDER — OXYCODONE AND ACETAMINOPHEN 5; 325 MG/1; MG/1
1 TABLET ORAL EVERY 6 HOURS PRN
Qty: 12 TABLET | Refills: 0 | Status: SHIPPED | OUTPATIENT
Start: 2023-12-29 | End: 2024-01-03

## 2023-12-31 DIAGNOSIS — F51.01 IDIOPATHIC INSOMNIA: ICD-10-CM

## 2023-12-31 DIAGNOSIS — I10 PRIMARY HYPERTENSION: ICD-10-CM

## 2024-01-02 RX ORDER — LOSARTAN POTASSIUM AND HYDROCHLOROTHIAZIDE 12.5; 1 MG/1; MG/1
1 TABLET ORAL DAILY
Qty: 90 TABLET | Refills: 0 | Status: SHIPPED | OUTPATIENT
Start: 2024-01-02 | End: 2024-04-05

## 2024-01-02 RX ORDER — TRAZODONE HYDROCHLORIDE 50 MG/1
50-150 TABLET ORAL NIGHTLY PRN
Qty: 180 TABLET | Refills: 0 | Status: SHIPPED | OUTPATIENT
Start: 2024-01-02 | End: 2024-04-05

## 2024-01-08 ENCOUNTER — TELEPHONE (OUTPATIENT)
Dept: PRIMARY CARE | Facility: CLINIC | Age: 59
End: 2024-01-08
Payer: MEDICARE

## 2024-01-08 DIAGNOSIS — J01.80 ACUTE NON-RECURRENT SINUSITIS OF OTHER SINUS: ICD-10-CM

## 2024-01-08 RX ORDER — AMOXICILLIN AND CLAVULANATE POTASSIUM 875; 125 MG/1; MG/1
875 TABLET, FILM COATED ORAL 2 TIMES DAILY
Qty: 20 TABLET | Refills: 0 | Status: SHIPPED | OUTPATIENT
Start: 2024-01-08 | End: 2024-01-23

## 2024-01-23 ENCOUNTER — TELEPHONE (OUTPATIENT)
Dept: PRIMARY CARE | Facility: CLINIC | Age: 59
End: 2024-01-23
Payer: MEDICARE

## 2024-01-23 DIAGNOSIS — J01.01 ACUTE RECURRENT MAXILLARY SINUSITIS: Primary | ICD-10-CM

## 2024-01-23 RX ORDER — AZITHROMYCIN 250 MG/1
TABLET, FILM COATED ORAL
Qty: 6 TABLET | Refills: 0 | Status: SHIPPED | OUTPATIENT
Start: 2024-01-23 | End: 2024-03-11 | Stop reason: SDUPTHER

## 2024-01-23 NOTE — PROGRESS NOTES
Subjective   Patient ID: Neema Mckeon is a 58 y.o. female who presents for No chief complaint on file..  HPI    Review of Systems    Objective   Physical Exam    Assessment/Plan            Kirill Gil DO 01/23/24 12:13 PM

## 2024-01-27 ENCOUNTER — OFFICE VISIT (OUTPATIENT)
Dept: PRIMARY CARE | Facility: CLINIC | Age: 59
End: 2024-01-27
Payer: MEDICARE

## 2024-01-27 VITALS
SYSTOLIC BLOOD PRESSURE: 124 MMHG | BODY MASS INDEX: 32.59 KG/M2 | DIASTOLIC BLOOD PRESSURE: 64 MMHG | WEIGHT: 184 LBS | HEART RATE: 94 BPM | OXYGEN SATURATION: 98 %

## 2024-01-27 DIAGNOSIS — G35 MULTIPLE SCLEROSIS (MULTI): ICD-10-CM

## 2024-01-27 DIAGNOSIS — M54.42 ACUTE LEFT-SIDED LOW BACK PAIN WITH LEFT-SIDED SCIATICA: Primary | ICD-10-CM

## 2024-01-27 PROCEDURE — 1036F TOBACCO NON-USER: CPT | Performed by: FAMILY MEDICINE

## 2024-01-27 PROCEDURE — 99213 OFFICE O/P EST LOW 20 MIN: CPT | Performed by: FAMILY MEDICINE

## 2024-01-27 PROCEDURE — 3074F SYST BP LT 130 MM HG: CPT | Performed by: FAMILY MEDICINE

## 2024-01-27 PROCEDURE — 3078F DIAST BP <80 MM HG: CPT | Performed by: FAMILY MEDICINE

## 2024-01-27 RX ORDER — PREDNISONE 10 MG/1
TABLET ORAL
Qty: 30 TABLET | Refills: 0 | Status: SHIPPED | OUTPATIENT
Start: 2024-01-27 | End: 2024-06-10 | Stop reason: SDUPTHER

## 2024-01-27 RX ORDER — OXYCODONE AND ACETAMINOPHEN 5; 325 MG/1; MG/1
1 TABLET ORAL EVERY 6 HOURS PRN
Qty: 15 TABLET | Refills: 0 | Status: SHIPPED | OUTPATIENT
Start: 2024-01-27 | End: 2024-03-11 | Stop reason: SDUPTHER

## 2024-01-27 NOTE — PROGRESS NOTES
Subjective   Patient ID: Neema Mckeon is a 58 y.o. female who presents for Back Pain (Pain moving down Lt leg for  2 days).  HPI  Feeling off balance  Vision is off  Having pain in left lower back and going down left leg  Sharp in nature  Worse- laying on it  Better- Percocet  No numbness  Left leg feeling a little weak  No GI/ incontinence  Some urge incontinence when sick/MS flairing up  No fever, chills  No weight loss    Current Outpatient Medications:     azithromycin (Zithromax) 250 mg tablet, Take 2 tablets (500 mg) by mouth once daily for 1 day, THEN 1 tablet (250 mg) once daily for 4 days. Take 2 tabs (500 mg) by mouth today, than 1 daily for 4 days.., Disp: 6 tablet, Rfl: 0    DULoxetine (Cymbalta) 30 mg DR capsule, Take 1 capsule (30 mg) by mouth once daily. Do not crush or chew., Disp: 90 capsule, Rfl: 1    losartan-hydrochlorothiazide (Hyzaar) 100-12.5 mg tablet, TAKE ONE TABLET BY MOUTH EVERY DAY, Disp: 90 tablet, Rfl: 0    traZODone (Desyrel) 50 mg tablet, take 1 to 3 tablets by mouth at bedtime as needed for sleep, Disp: 180 tablet, Rfl: 0    cetirizine (ZyrTEC) 10 mg tablet, Take 1 tablet (10 mg) by mouth once daily. (Patient not taking: Reported on 1/27/2024), Disp: 30 tablet, Rfl: 5    oxyCODONE-acetaminophen (Percocet) 5-325 mg tablet, Take 1 tablet by mouth every 6 hours if needed for severe pain (7 - 10) for up to 7 days., Disp: 15 tablet, Rfl: 0    predniSONE (Deltasone) 10 mg tablet, Take 5 tablets (50 mg) by mouth once daily for 2 days, THEN 4 tablets (40 mg) once daily for 2 days, THEN 3 tablets (30 mg) once daily for 2 days, THEN 2 tablets (20 mg) once daily for 2 days, THEN 1 tablet (10 mg) once daily for 2 days., Disp: 30 tablet, Rfl: 0   Past Surgical History:   Procedure Laterality Date    CERVICAL FUSION  05/20/2015    Cervical Vertebral Fusion    CHOLECYSTECTOMY  05/20/2015    Cholecystectomy    CT ABDOMEN PELVIS ANGIOGRAM W AND/OR WO IV CONTRAST  9/13/2023    CT ABDOMEN PELVIS  ANGIOGRAM W AND/OR WO IV CONTRAST 9/13/2023 [id number] CT    HYSTERECTOMY  05/20/2015    Hysterectomy    MR HEAD ANGIO WO IV CONTRAST  10/9/2019    MR HEAD ANGIO WO IV CONTRAST 10/9/2019 [facility]      Past Medical History:   Diagnosis Date    Acute bronchitis due to other specified organisms 09/20/2021    Acute bronchitis due to other specified organisms    Acute low back pain 03/13/2023    Acute maxillary sinusitis, unspecified 11/15/2019    Acute maxillary sinusitis    Acute nasopharyngitis (common cold) 11/14/2019    Nasopharyngitis    Acute right-sided low back pain with left-sided sciatica 03/13/2023    Cervical radicular pain 03/13/2023    Chest pain 03/13/2023    Headache, unspecified 06/24/2020    Intractable headache    Infrapatellar bursitis of left knee 03/13/2023    Leg pain, bilateral 03/13/2023    Neoplasm of uncertain behavior of skin of upper extremity 03/13/2023    Other acute sinusitis 02/02/2022    Other acute sinusitis, recurrence not specified    Other psychoactive substance use, 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    Drug-induced insomnia    Otitis media, unspecified, right ear 03/24/2018    Acute right otitis media    Personal history of other diseases of the respiratory system 11/23/2020    History of acute sinusitis    Personal history of other diseases of the respiratory system 11/04/2019    History of sinusitis    Personal history of other diseases of the respiratory system 02/15/2019    History of acute sinusitis    Personal history of other infectious and parasitic diseases 11/12/2020    History of viral infection    Personal history of other specified conditions 03/10/2020    History of epigastric pain    Personal history of other specified conditions 11/25/2019    History of epigastric pain    Skin lesion of left upper extremity 03/13/2023    Voice disturbance 03/13/2023    Weakness 03/13/2023     Social History     Tobacco  Use    Smoking status: Never    Smokeless tobacco: Never   Substance Use Topics    Alcohol use: Never    Drug use: Never      No family history on file.   Review of Systems    Objective   /64   Pulse 94   Wt 83.5 kg (184 lb)   SpO2 98%   BMI 32.59 kg/m²    Physical Exam  Constitutional:       General: She is not in acute distress.     Appearance: Normal appearance. She is not ill-appearing.   HENT:      Head: Normocephalic and atraumatic.   Eyes:      Extraocular Movements: Extraocular movements intact.      Conjunctiva/sclera: Conjunctivae normal.      Pupils: Pupils are equal, round, and reactive to light.   Cardiovascular:      Rate and Rhythm: Normal rate and regular rhythm.      Pulses: Normal pulses.      Heart sounds: Normal heart sounds.   Pulmonary:      Effort: Pulmonary effort is normal.      Breath sounds: Normal breath sounds.   Musculoskeletal:      Cervical back: Normal range of motion and neck supple.      Comments: TTP in left lower lumbars with increased muscle tone   Skin:     Capillary Refill: Capillary refill takes less than 2 seconds.   Neurological:      General: No focal deficit present.      Mental Status: She is alert and oriented to person, place, and time.      Sensory: No sensory deficit.      Motor: No weakness.      Deep Tendon Reflexes: Reflexes normal.   Psychiatric:         Mood and Affect: Mood normal.         Behavior: Behavior normal.         Assessment/Plan   Problem List Items Addressed This Visit       Multiple sclerosis (CMS/HCC)    Relevant Medications    predniSONE (Deltasone) 10 mg tablet     Other Visit Diagnoses       Acute left-sided low back pain with left-sided sciatica    -  Primary    Relevant Medications    predniSONE (Deltasone) 10 mg tablet    oxyCODONE-acetaminophen (Percocet) 5-325 mg tablet        Heat, stretches    Patient understands and agrees with treatment plan    Kirill Gil DO

## 2024-02-19 ENCOUNTER — TELEPHONE (OUTPATIENT)
Dept: PRIMARY CARE | Facility: CLINIC | Age: 59
End: 2024-02-19
Payer: MEDICARE

## 2024-02-19 DIAGNOSIS — J01.80 ACUTE NON-RECURRENT SINUSITIS OF OTHER SINUS: ICD-10-CM

## 2024-02-19 RX ORDER — AMOXICILLIN AND CLAVULANATE POTASSIUM 875; 125 MG/1; MG/1
875 TABLET, FILM COATED ORAL 2 TIMES DAILY
Qty: 20 TABLET | Refills: 0 | Status: SHIPPED | OUTPATIENT
Start: 2024-02-19 | End: 2024-02-29

## 2024-03-11 ENCOUNTER — TELEPHONE (OUTPATIENT)
Dept: PRIMARY CARE | Facility: CLINIC | Age: 59
End: 2024-03-11
Payer: MEDICARE

## 2024-03-11 DIAGNOSIS — M54.42 ACUTE LEFT-SIDED LOW BACK PAIN WITH LEFT-SIDED SCIATICA: ICD-10-CM

## 2024-03-11 DIAGNOSIS — J01.01 ACUTE RECURRENT MAXILLARY SINUSITIS: ICD-10-CM

## 2024-03-11 RX ORDER — OXYCODONE AND ACETAMINOPHEN 5; 325 MG/1; MG/1
1 TABLET ORAL EVERY 6 HOURS PRN
Qty: 15 TABLET | Refills: 0 | Status: SHIPPED | OUTPATIENT
Start: 2024-03-11 | End: 2024-04-22 | Stop reason: SDUPTHER

## 2024-03-11 RX ORDER — AZITHROMYCIN 250 MG/1
TABLET, FILM COATED ORAL
Qty: 6 TABLET | Refills: 0 | Status: SHIPPED | OUTPATIENT
Start: 2024-03-11 | End: 2024-03-15

## 2024-03-11 NOTE — TELEPHONE ENCOUNTER
Pt called stating the augmentin she took for her sin inf didn't get rid of it and wondered if you would send something else in to [facility].   She also asked for a prescription of percocet also.

## 2024-04-05 DIAGNOSIS — F51.01 IDIOPATHIC INSOMNIA: ICD-10-CM

## 2024-04-05 DIAGNOSIS — I10 PRIMARY HYPERTENSION: ICD-10-CM

## 2024-04-05 RX ORDER — TRAZODONE HYDROCHLORIDE 50 MG/1
50-150 TABLET ORAL NIGHTLY PRN
Qty: 180 TABLET | Refills: 0 | Status: SHIPPED | OUTPATIENT
Start: 2024-04-05

## 2024-04-05 RX ORDER — LOSARTAN POTASSIUM AND HYDROCHLOROTHIAZIDE 12.5; 1 MG/1; MG/1
1 TABLET ORAL DAILY
Qty: 90 TABLET | Refills: 0 | Status: SHIPPED | OUTPATIENT
Start: 2024-04-05

## 2024-04-09 ENCOUNTER — TELEPHONE (OUTPATIENT)
Dept: PRIMARY CARE | Facility: CLINIC | Age: 59
End: 2024-04-09
Payer: MEDICARE

## 2024-04-09 DIAGNOSIS — G43.009 MIGRAINE WITHOUT AURA AND WITHOUT STATUS MIGRAINOSUS, NOT INTRACTABLE: Primary | ICD-10-CM

## 2024-04-09 RX ORDER — SUMATRIPTAN SUCCINATE 100 MG/1
100 TABLET ORAL ONCE AS NEEDED
Qty: 9 TABLET | Refills: 5 | Status: SHIPPED | OUTPATIENT
Start: 2024-04-09 | End: 2025-04-09

## 2024-04-09 NOTE — TELEPHONE ENCOUNTER
Having sharp unilateral  pain in right eyebrow and behind eye  + nausea  Some blurry vision  Some dizziness  No numbness, weakness  Light makes worse some  No hearing changes    Will try Imitrex- if no better the steroid- ? MS presentation

## 2024-04-09 NOTE — PROGRESS NOTES
Subjective   Patient ID: Neema Mckeon is a 59 y.o. female who presents for No chief complaint on file..  HPI    Review of Systems    Objective   Physical Exam    Assessment/Plan            Kirill Gil DO 04/09/24 1:55 PM

## 2024-04-22 ENCOUNTER — TELEPHONE (OUTPATIENT)
Dept: PRIMARY CARE | Facility: CLINIC | Age: 59
End: 2024-04-22
Payer: MEDICARE

## 2024-04-22 DIAGNOSIS — M54.42 ACUTE LEFT-SIDED LOW BACK PAIN WITH LEFT-SIDED SCIATICA: ICD-10-CM

## 2024-04-22 RX ORDER — OXYCODONE AND ACETAMINOPHEN 5; 325 MG/1; MG/1
1 TABLET ORAL EVERY 6 HOURS PRN
Qty: 15 TABLET | Refills: 0 | Status: SHIPPED | OUTPATIENT
Start: 2024-04-22 | End: 2024-05-20 | Stop reason: SDUPTHER

## 2024-05-09 ENCOUNTER — TELEPHONE (OUTPATIENT)
Dept: PRIMARY CARE | Facility: CLINIC | Age: 59
End: 2024-05-09
Payer: MEDICARE

## 2024-05-09 DIAGNOSIS — J01.40 ACUTE NON-RECURRENT PANSINUSITIS: ICD-10-CM

## 2024-05-09 RX ORDER — AMOXICILLIN AND CLAVULANATE POTASSIUM 875; 125 MG/1; MG/1
875 TABLET, FILM COATED ORAL 2 TIMES DAILY
Qty: 20 TABLET | Refills: 0 | Status: SHIPPED | OUTPATIENT
Start: 2024-05-09 | End: 2024-05-16

## 2024-05-16 ENCOUNTER — TELEPHONE (OUTPATIENT)
Dept: PRIMARY CARE | Facility: CLINIC | Age: 59
End: 2024-05-16
Payer: MEDICARE

## 2024-05-16 DIAGNOSIS — J01.01 ACUTE RECURRENT MAXILLARY SINUSITIS: Primary | ICD-10-CM

## 2024-05-16 RX ORDER — CLARITHROMYCIN 500 MG/1
500 TABLET, FILM COATED ORAL 2 TIMES DAILY
Qty: 20 TABLET | Refills: 0 | Status: SHIPPED | OUTPATIENT
Start: 2024-05-16 | End: 2024-05-26

## 2024-05-20 ENCOUNTER — TELEPHONE (OUTPATIENT)
Dept: PRIMARY CARE | Facility: CLINIC | Age: 59
End: 2024-05-20
Payer: MEDICARE

## 2024-05-20 DIAGNOSIS — M54.42 ACUTE LEFT-SIDED LOW BACK PAIN WITH LEFT-SIDED SCIATICA: ICD-10-CM

## 2024-05-20 RX ORDER — OXYCODONE AND ACETAMINOPHEN 5; 325 MG/1; MG/1
1 TABLET ORAL EVERY 6 HOURS PRN
Qty: 15 TABLET | Refills: 0 | Status: SHIPPED | OUTPATIENT
Start: 2024-05-20 | End: 2024-06-10 | Stop reason: SDUPTHER

## 2024-06-03 DIAGNOSIS — F33.1 MDD (MAJOR DEPRESSIVE DISORDER), RECURRENT EPISODE, MODERATE (MULTI): ICD-10-CM

## 2024-06-03 RX ORDER — DULOXETIN HYDROCHLORIDE 30 MG/1
30 CAPSULE, DELAYED RELEASE ORAL DAILY
Qty: 90 CAPSULE | Refills: 0 | Status: SHIPPED | OUTPATIENT
Start: 2024-06-03

## 2024-06-10 ENCOUNTER — APPOINTMENT (OUTPATIENT)
Dept: PAIN MEDICINE | Facility: CLINIC | Age: 59
End: 2024-06-10
Payer: MEDICARE

## 2024-06-10 ENCOUNTER — TELEPHONE (OUTPATIENT)
Dept: PRIMARY CARE | Facility: CLINIC | Age: 59
End: 2024-06-10
Payer: MEDICARE

## 2024-06-10 DIAGNOSIS — G35 MULTIPLE SCLEROSIS (MULTI): ICD-10-CM

## 2024-06-10 DIAGNOSIS — M54.42 ACUTE LEFT-SIDED LOW BACK PAIN WITH LEFT-SIDED SCIATICA: ICD-10-CM

## 2024-06-10 RX ORDER — PREDNISONE 10 MG/1
TABLET ORAL DAILY
Qty: 30 TABLET | Refills: 0 | Status: SHIPPED | OUTPATIENT
Start: 2024-06-10 | End: 2024-06-19

## 2024-06-10 RX ORDER — OXYCODONE AND ACETAMINOPHEN 5; 325 MG/1; MG/1
1 TABLET ORAL EVERY 6 HOURS PRN
Qty: 15 TABLET | Refills: 0 | Status: SHIPPED | OUTPATIENT
Start: 2024-06-10 | End: 2024-06-17

## 2024-06-17 ENCOUNTER — HOSPITAL ENCOUNTER (EMERGENCY)
Facility: HOSPITAL | Age: 59
Discharge: HOME | End: 2024-06-17
Payer: MEDICARE

## 2024-06-17 VITALS
OXYGEN SATURATION: 100 % | WEIGHT: 179.23 LBS | TEMPERATURE: 98.1 F | BODY MASS INDEX: 31.76 KG/M2 | DIASTOLIC BLOOD PRESSURE: 93 MMHG | HEIGHT: 63 IN | RESPIRATION RATE: 18 BRPM | HEART RATE: 74 BPM | SYSTOLIC BLOOD PRESSURE: 174 MMHG

## 2024-06-17 DIAGNOSIS — T38.0X5A ADVERSE EFFECT OF CORTICOSTEROIDS, INITIAL ENCOUNTER: Primary | ICD-10-CM

## 2024-06-17 DIAGNOSIS — B37.0 THRUSH: ICD-10-CM

## 2024-06-17 PROCEDURE — 99282 EMERGENCY DEPT VISIT SF MDM: CPT

## 2024-06-17 RX ORDER — CLOTRIMAZOLE 10 MG/1
10 LOZENGE ORAL
Qty: 35 TROCHE | Refills: 0 | Status: SHIPPED | OUTPATIENT
Start: 2024-06-17 | End: 2024-06-27

## 2024-06-17 ASSESSMENT — PAIN DESCRIPTION - PAIN TYPE: TYPE: ACUTE PAIN

## 2024-06-17 ASSESSMENT — COLUMBIA-SUICIDE SEVERITY RATING SCALE - C-SSRS
1. IN THE PAST MONTH, HAVE YOU WISHED YOU WERE DEAD OR WISHED YOU COULD GO TO SLEEP AND NOT WAKE UP?: NO
6. HAVE YOU EVER DONE ANYTHING, STARTED TO DO ANYTHING, OR PREPARED TO DO ANYTHING TO END YOUR LIFE?: NO
2. HAVE YOU ACTUALLY HAD ANY THOUGHTS OF KILLING YOURSELF?: NO

## 2024-06-17 ASSESSMENT — PAIN SCALES - GENERAL: PAINLEVEL_OUTOF10: 7

## 2024-06-17 ASSESSMENT — PAIN DESCRIPTION - PROGRESSION: CLINICAL_PROGRESSION: NOT CHANGED

## 2024-06-17 ASSESSMENT — PAIN DESCRIPTION - FREQUENCY: FREQUENCY: CONSTANT/CONTINUOUS

## 2024-06-17 ASSESSMENT — PAIN DESCRIPTION - ONSET: ONSET: SUDDEN

## 2024-06-17 ASSESSMENT — PAIN - FUNCTIONAL ASSESSMENT: PAIN_FUNCTIONAL_ASSESSMENT: 0-10

## 2024-06-17 ASSESSMENT — PAIN DESCRIPTION - DESCRIPTORS: DESCRIPTORS: DULL;ACHING

## 2024-06-17 NOTE — ED PROVIDER NOTES
HPI   Chief Complaint   Patient presents with    Medication Reaction     I stared taking prednisone for my ms flare up 10 day taper and I started feeling out of it shaky thrush chest pains tachycardia emotional depressed I stopped taking it yesterday        HPI  Patient is a 59-year-old female with history of MS who presents to ED for side effects of prednisone.  Patient is on a 10-day taper for MS.  She states she started feeling very shaky, emotional outburst, depression, heart racing.  She also complains of thrush symptoms.  She denies any chest pain or shortness of breath.  Patient states she stopped taking her prednisone days ago.  She is well-appearing and in no acute distress.  Denies any fever or chills, headache or dizziness, cough or congestion, chest pain or shortness of breath, abdominal pain or NVD, urinary symptoms.  Has any recent hospitalizations or surgeries.  Denies any recent travel.                  William Coma Scale Score: 15                     Patient History   Past Medical History:   Diagnosis Date    Acute bronchitis due to other specified organisms 09/20/2021    Acute bronchitis due to other specified organisms    Acute low back pain 03/13/2023    Acute maxillary sinusitis, unspecified 11/15/2019    Acute maxillary sinusitis    Acute nasopharyngitis (common cold) 11/14/2019    Nasopharyngitis    Acute right-sided low back pain with left-sided sciatica 03/13/2023    Cervical radicular pain 03/13/2023    Chest pain 03/13/2023    Headache, unspecified 06/24/2020    Intractable headache    Infrapatellar bursitis of left knee 03/13/2023    Leg pain, bilateral 03/13/2023    Neoplasm of uncertain behavior of skin of upper extremity 03/13/2023    Other acute sinusitis 02/02/2022    Other acute sinusitis, recurrence not specified    Other psychoactive substance use, 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) 06/20/2019    Drug-induced insomnia    Otitis media,  unspecified, right ear 03/24/2018    Acute right otitis media    Personal history of other diseases of the respiratory system 11/23/2020    History of acute sinusitis    Personal history of other diseases of the respiratory system 11/04/2019    History of sinusitis    Personal history of other diseases of the respiratory system 02/15/2019    History of acute sinusitis    Personal history of other infectious and parasitic diseases 11/12/2020    History of viral infection    Personal history of other specified conditions 03/10/2020    History of epigastric pain    Personal history of other specified conditions 11/25/2019    History of epigastric pain    Skin lesion of left upper extremity 03/13/2023    Voice disturbance 03/13/2023    Weakness 03/13/2023     Past Surgical History:   Procedure Laterality Date    CERVICAL FUSION  05/20/2015    Cervical Vertebral Fusion    CHOLECYSTECTOMY  05/20/2015    Cholecystectomy    CT ABDOMEN PELVIS ANGIOGRAM W AND/OR WO IV CONTRAST  9/13/2023    CT ABDOMEN PELVIS ANGIOGRAM W AND/OR WO IV CONTRAST 9/13/2023 [id number] CT    HYSTERECTOMY  05/20/2015    Hysterectomy    MR HEAD ANGIO WO IV CONTRAST  10/9/2019    MR HEAD ANGIO WO IV CONTRAST 10/9/2019 [facility]     No family history on file.  Social History     Tobacco Use    Smoking status: Never    Smokeless tobacco: Never   Substance Use Topics    Alcohol use: Never    Drug use: Never       Physical Exam   ED Triage Vitals [06/17/24 0913]   Temperature Heart Rate Respirations BP   36.7 °C (98.1 °F) 74 18 (!) 174/93      Pulse Ox Temp Source Heart Rate Source Patient Position   100 % Oral Monitor Sitting      BP Location FiO2 (%)     Left arm --       Physical Exam  Vitals reviewed.   Constitutional:       Appearance: Normal appearance.   HENT:      Head: Normocephalic and atraumatic.   Eyes:      Extraocular Movements: Extraocular movements intact.   Cardiovascular:      Rate and Rhythm: Normal rate and regular rhythm.    Pulmonary:      Effort: Pulmonary effort is normal.      Breath sounds: Normal breath sounds.   Abdominal:      General: Abdomen is flat.      Palpations: Abdomen is soft.      Tenderness: There is no abdominal tenderness.   Musculoskeletal:         General: Normal range of motion.      Cervical back: Normal range of motion and neck supple.   Skin:     General: Skin is warm and dry.   Neurological:      General: No focal deficit present.      Mental Status: She is alert and oriented to person, place, and time.   Psychiatric:         Mood and Affect: Mood normal.         Behavior: Behavior normal.         ED Course & MDM   Diagnoses as of 06/17/24 1129   Adverse effect of corticosteroids, initial encounter   Thrush       Medical Decision Making  Parts of this chart have been completed using voice recognition software. Please excuse any errors of transcription.  My thought process and reason for plan has been formulated from the time that I saw the patient until the time of disposition and is not specific to one specific moment during their visit and furthermore my MDM encompasses this entire chart and not only this text box.    HPI:   Detailed above.    Exam:   A medically appropriate exam performed, outlined above, given the known history and presentation.    History obtained from:   Patient    EKG/Cardiac monitor:     Social Determinants of Health considered during this visit:       Medications given during visit:  Medications - No data to display     Diagnostic/tests:  Labs Reviewed - No data to display   No orders to display        Differential Diagnosis:       Consultations:      Procedures:      Critical Care:      Referrals:      Discharge Prescriptions:      MDM Summary:  Patient will follow-up with primary care provider soon as possible.  She will not take anymore prednisone.    We have discussed the diagnosis and risks, and we agree with discharging home to follow-up with appropriate physician as  directed. We also discussed returning to the Emergency Department immediately if new or worsening symptoms occur. We have discussed the symptoms which are most concerning that necessitate immediate return. Pt symptoms have been well controlled here and the patient is safe for discharge with appropriate outpatient follow up. The patient has verbalized understanding to return to ER without delay for new or worsening pains or for any other symptoms or concerns. I utilized the discharge clinical management tool provided [employer] to help estimate risk of negative outcome for this patient.      Disposition:  The patient was discharged      Procedure  Procedures     Donavan Brannon PA-C  06/17/24 1132

## 2024-06-19 ENCOUNTER — OFFICE VISIT (OUTPATIENT)
Dept: PRIMARY CARE | Facility: CLINIC | Age: 59
End: 2024-06-19
Payer: MEDICARE

## 2024-06-19 VITALS
BODY MASS INDEX: 31.71 KG/M2 | SYSTOLIC BLOOD PRESSURE: 142 MMHG | OXYGEN SATURATION: 99 % | HEIGHT: 63 IN | HEART RATE: 94 BPM | WEIGHT: 179 LBS | DIASTOLIC BLOOD PRESSURE: 82 MMHG

## 2024-06-19 DIAGNOSIS — R73.9 HYPERGLYCEMIA: ICD-10-CM

## 2024-06-19 DIAGNOSIS — R41.0 CONFUSION: ICD-10-CM

## 2024-06-19 DIAGNOSIS — I10 PRIMARY HYPERTENSION: ICD-10-CM

## 2024-06-19 DIAGNOSIS — F33.1 MDD (MAJOR DEPRESSIVE DISORDER), RECURRENT EPISODE, MODERATE (MULTI): ICD-10-CM

## 2024-06-19 DIAGNOSIS — R42 DIZZINESS: Primary | ICD-10-CM

## 2024-06-19 LAB
ALBUMIN SERPL BCP-MCNC: 4.6 G/DL (ref 3.4–5)
ALP SERPL-CCNC: 101 U/L (ref 33–136)
ALT SERPL W P-5'-P-CCNC: 18 U/L (ref 7–45)
ANION GAP SERPL CALC-SCNC: 16 MMOL/L (ref 10–20)
AST SERPL W P-5'-P-CCNC: 23 U/L (ref 9–39)
BASOPHILS # BLD AUTO: 0.02 X10*3/UL (ref 0–0.1)
BASOPHILS NFR BLD AUTO: 0.2 %
BILIRUB SERPL-MCNC: 0.5 MG/DL (ref 0–1.2)
BUN SERPL-MCNC: 13 MG/DL (ref 6–23)
CALCIUM SERPL-MCNC: 9.2 MG/DL (ref 8.6–10.3)
CHLORIDE SERPL-SCNC: 102 MMOL/L (ref 98–107)
CHOLEST SERPL-MCNC: 231 MG/DL (ref 0–199)
CHOLESTEROL/HDL RATIO: 4
CO2 SERPL-SCNC: 23 MMOL/L (ref 21–32)
CREAT SERPL-MCNC: 0.78 MG/DL (ref 0.5–1.05)
EGFRCR SERPLBLD CKD-EPI 2021: 87 ML/MIN/1.73M*2
EOSINOPHIL # BLD AUTO: 0.23 X10*3/UL (ref 0–0.7)
EOSINOPHIL NFR BLD AUTO: 2.1 %
ERYTHROCYTE [DISTWIDTH] IN BLOOD BY AUTOMATED COUNT: 12.6 % (ref 11.5–14.5)
GLUCOSE SERPL-MCNC: 129 MG/DL (ref 74–99)
HCT VFR BLD AUTO: 45.2 % (ref 36–46)
HDLC SERPL-MCNC: 58.1 MG/DL
HGB BLD-MCNC: 14.4 G/DL (ref 12–16)
IMM GRANULOCYTES # BLD AUTO: 0.07 X10*3/UL (ref 0–0.7)
IMM GRANULOCYTES NFR BLD AUTO: 0.6 % (ref 0–0.9)
LDLC SERPL CALC-MCNC: 148 MG/DL
LYMPHOCYTES # BLD AUTO: 3.31 X10*3/UL (ref 1.2–4.8)
LYMPHOCYTES NFR BLD AUTO: 30.6 %
MCH RBC QN AUTO: 30.1 PG (ref 26–34)
MCHC RBC AUTO-ENTMCNC: 31.9 G/DL (ref 32–36)
MCV RBC AUTO: 95 FL (ref 80–100)
MONOCYTES # BLD AUTO: 0.72 X10*3/UL (ref 0.1–1)
MONOCYTES NFR BLD AUTO: 6.7 %
NEUTROPHILS # BLD AUTO: 6.47 X10*3/UL (ref 1.2–7.7)
NEUTROPHILS NFR BLD AUTO: 59.8 %
NON HDL CHOLESTEROL: 173 MG/DL (ref 0–149)
NRBC BLD-RTO: 0 /100 WBCS (ref 0–0)
PLATELET # BLD AUTO: 116 X10*3/UL (ref 150–450)
POC APPEARANCE, URINE: CLEAR
POC BILIRUBIN, URINE: NEGATIVE
POC BLOOD, URINE: ABNORMAL
POC COLOR, URINE: YELLOW
POC GLUCOSE, URINE: NEGATIVE MG/DL
POC KETONES, URINE: NEGATIVE MG/DL
POC LEUKOCYTES, URINE: NEGATIVE
POC NITRITE,URINE: NEGATIVE
POC PH, URINE: 5.5 PH
POC PROTEIN, URINE: NEGATIVE MG/DL
POC SPECIFIC GRAVITY, URINE: 1.01
POC UROBILINOGEN, URINE: 0.2 EU/DL
POTASSIUM SERPL-SCNC: 3.8 MMOL/L (ref 3.5–5.3)
PROT SERPL-MCNC: 6.6 G/DL (ref 6.4–8.2)
RBC # BLD AUTO: 4.78 X10*6/UL (ref 4–5.2)
RBC MORPH BLD: NORMAL
SODIUM SERPL-SCNC: 137 MMOL/L (ref 136–145)
TRIGL SERPL-MCNC: 126 MG/DL (ref 0–149)
TSH SERPL-ACNC: 1.64 MIU/L (ref 0.44–3.98)
VLDL: 25 MG/DL (ref 0–40)
WBC # BLD AUTO: 10.8 X10*3/UL (ref 4.4–11.3)

## 2024-06-19 PROCEDURE — 85025 COMPLETE CBC W/AUTO DIFF WBC: CPT

## 2024-06-19 PROCEDURE — 83036 HEMOGLOBIN GLYCOSYLATED A1C: CPT

## 2024-06-19 PROCEDURE — 84443 ASSAY THYROID STIM HORMONE: CPT

## 2024-06-19 PROCEDURE — 3079F DIAST BP 80-89 MM HG: CPT | Performed by: FAMILY MEDICINE

## 2024-06-19 PROCEDURE — 36415 COLL VENOUS BLD VENIPUNCTURE: CPT

## 2024-06-19 PROCEDURE — 81003 URINALYSIS AUTO W/O SCOPE: CPT | Performed by: FAMILY MEDICINE

## 2024-06-19 PROCEDURE — 80061 LIPID PANEL: CPT

## 2024-06-19 PROCEDURE — 80053 COMPREHEN METABOLIC PANEL: CPT

## 2024-06-19 PROCEDURE — 99214 OFFICE O/P EST MOD 30 MIN: CPT | Performed by: FAMILY MEDICINE

## 2024-06-19 PROCEDURE — 1036F TOBACCO NON-USER: CPT | Performed by: FAMILY MEDICINE

## 2024-06-19 PROCEDURE — 3077F SYST BP >= 140 MM HG: CPT | Performed by: FAMILY MEDICINE

## 2024-06-19 NOTE — PROGRESS NOTES
"Subjective   Patient ID: Neema Mckeon is a 59 y.o. female who presents for Follow-up (Having some issues with her MS feels like balance is off left leg bothering her ).  HPI  Neema is a 60 yo F presenting for MS flare and adverse reaction to prednisone.   She endorses increased stress with caring for her son (h/o traumatic TBI in son, 37 yo), this triggered her most recent MS flare. Her flares consist of urinary incontinence, blurry eyesight, left leg pain, and mild incoordination. She denies mood symptoms during her flares.     She quit her job while on prednisone. She states that she was \"really crazy\", very emotional and felt that she could not continue working. She worked as a  at [employer] for the past 2 years.  She had been crying excessively, pounding headache, felt suicidal, , she got lost while driving in her home town, and CP for 10 days. She noticed these symptoms by day 3/4 of prednisone taper. She has not taken it since the 17th when she went to the ED for her symptoms.  Her last eye exam was 2 years ago, she endorses chronic vision changes.     At the ED her BP was 174/93 mmHg with pounding headache and dizziness.     In the past she has had emotional issue when taking prednisone and 1 seizure when given IV steroids.     Since being off prednisone, she has felt off balance, \"head is not right\", cannot concentrate, grogginess, confusion, and nauseous. She denies recent illness or head injury.    She has a history of hyperlipidemia, refuses statin.    She endorses SI yesterday, she denies intent, plan, or actions taken to complete.  She just booked an appointment to attend counseling at [facility].         Current Outpatient Medications:     clotrimazole (Mycelex) 10 mg marlon, Take 1 tablet (10 mg) by mouth 5 times a day for 10 days., Disp: 35 Marlon, Rfl: 0    DULoxetine (Cymbalta) 30 mg DR capsule, TAKE ONE CAPSULE BY MOUTH EVERY DAY. DO NOT CRUSH OR CHEW, Disp: 90 capsule, Rfl: " 0    losartan-hydrochlorothiazide (Hyzaar) 100-12.5 mg tablet, TAKE ONE TABLET BY MOUTH EVERY DAY, Disp: 90 tablet, Rfl: 0    SUMAtriptan (Imitrex) 100 mg tablet, Take 1 tablet (100 mg) by mouth 1 time if needed for migraine., Disp: 9 tablet, Rfl: 5    traZODone (Desyrel) 50 mg tablet, TAKE 1 TO 3 TABLETS BY MOUTH AT BEDTIME AS NEEDED FOR SLEEP, Disp: 180 tablet, Rfl: 0   Past Surgical History:   Procedure Laterality Date    CERVICAL FUSION  05/20/2015    Cervical Vertebral Fusion    CHOLECYSTECTOMY  05/20/2015    Cholecystectomy    CT ABDOMEN PELVIS ANGIOGRAM W AND/OR WO IV CONTRAST  9/13/2023    CT ABDOMEN PELVIS ANGIOGRAM W AND/OR WO IV CONTRAST 9/13/2023 [id number] CT    HYSTERECTOMY  05/20/2015    Hysterectomy    MR HEAD ANGIO WO IV CONTRAST  10/9/2019    MR HEAD ANGIO WO IV CONTRAST 10/9/2019 [facility]      Past Medical History:   Diagnosis Date    Acute bronchitis due to other specified organisms 09/20/2021    Acute bronchitis due to other specified organisms    Acute low back pain 03/13/2023    Acute maxillary sinusitis, unspecified 11/15/2019    Acute maxillary sinusitis    Acute nasopharyngitis (common cold) 11/14/2019    Nasopharyngitis    Acute right-sided low back pain with left-sided sciatica 03/13/2023    Cervical radicular pain 03/13/2023    Chest pain 03/13/2023    Headache, unspecified 06/24/2020    Intractable headache    Infrapatellar bursitis of left knee 03/13/2023    Leg pain, bilateral 03/13/2023    Neoplasm of uncertain behavior of skin of upper extremity 03/13/2023    Other acute sinusitis 02/02/2022    Other acute sinusitis, recurrence not specified    Other psychoactive substance use, 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) 06/20/2019    Drug-induced insomnia    Otitis media, unspecified, right ear 03/24/2018    Acute right otitis media    Personal history of other diseases of the respiratory system 11/23/2020    History of acute sinusitis     "Personal history of other diseases of the respiratory system 11/04/2019    History of sinusitis    Personal history of other diseases of the respiratory system 02/15/2019    History of acute sinusitis    Personal history of other infectious and parasitic diseases 11/12/2020    History of viral infection    Personal history of other specified conditions 03/10/2020    History of epigastric pain    Personal history of other specified conditions 11/25/2019    History of epigastric pain    Skin lesion of left upper extremity 03/13/2023    Voice disturbance 03/13/2023    Weakness 03/13/2023     Social History     Tobacco Use    Smoking status: Never    Smokeless tobacco: Never   Substance Use Topics    Alcohol use: Never    Drug use: Never      No family history on file.   Review of Systems    Objective   /82   Pulse 94   Ht 1.6 m (5' 3\")   Wt 81.2 kg (179 lb)   SpO2 99%   BMI 31.71 kg/m²    Physical Exam  Vitals and nursing note reviewed.   Constitutional:       General: She is not in acute distress.     Appearance: Normal appearance. She is not ill-appearing.   HENT:      Head: Normocephalic and atraumatic.   Eyes:      Extraocular Movements: Extraocular movements intact.      Conjunctiva/sclera: Conjunctivae normal.      Pupils: Pupils are equal, round, and reactive to light.   Neck:      Vascular: No carotid bruit.   Cardiovascular:      Rate and Rhythm: Normal rate and regular rhythm.      Pulses: Normal pulses.      Heart sounds: Normal heart sounds.   Pulmonary:      Effort: Pulmonary effort is normal.      Breath sounds: Normal breath sounds.   Abdominal:      Tenderness: Negative signs include Tatum's sign and McBurney's sign.   Musculoskeletal:         General: Normal range of motion.      Cervical back: Normal range of motion and neck supple.   Lymphadenopathy:      Cervical: No cervical adenopathy.   Skin:     Capillary Refill: Capillary refill takes less than 2 seconds.   Neurological:      " General: No focal deficit present.      Mental Status: She is alert and oriented to person, place, and time.   Psychiatric:         Mood and Affect: Mood normal.         Behavior: Behavior normal.      Comments: Seems a little confused         Assessment/Plan   Problem List Items Addressed This Visit       Primary hypertension    Relevant Orders    TSH with reflex to Free T4 if abnormal (Completed)    Lipid Panel (Completed)    MDD (major depressive disorder), recurrent episode, moderate (Multi)     Other Visit Diagnoses       Dizziness    -  Primary    Relevant Orders    CBC and Auto Differential (Completed)    Comprehensive Metabolic Panel (Completed)    CT head wo IV contrast    TSH with reflex to Free T4 if abnormal (Completed)    Morphology (Completed)    Confusion        Relevant Orders    POCT UA Automated manually resulted (Completed)        Dizziness/confusion- check labs, UA, CT head    MDD- continue meds, go to counseling    HTN- continue meds, monitor BP at home, DASH diet    Patient understands and agrees with treatment plan    Kirill Gil, DO

## 2024-06-20 ENCOUNTER — TELEPHONE (OUTPATIENT)
Dept: PRIMARY CARE | Facility: CLINIC | Age: 59
End: 2024-06-20
Payer: MEDICARE

## 2024-06-20 DIAGNOSIS — R73.9 HYPERGLYCEMIA: Primary | ICD-10-CM

## 2024-06-20 DIAGNOSIS — R00.2 PALPITATIONS: ICD-10-CM

## 2024-06-20 LAB
EST. AVERAGE GLUCOSE BLD GHB EST-MCNC: 114 MG/DL
HBA1C MFR BLD: 5.6 %

## 2024-06-24 ENCOUNTER — TELEPHONE (OUTPATIENT)
Dept: PRIMARY CARE | Facility: CLINIC | Age: 59
End: 2024-06-24
Payer: MEDICARE

## 2024-06-24 RX ORDER — METOPROLOL SUCCINATE 25 MG/1
25 TABLET, EXTENDED RELEASE ORAL DAILY
Qty: 30 TABLET | Refills: 0 | Status: SHIPPED | OUTPATIENT
Start: 2024-06-24 | End: 2024-07-24

## 2024-06-24 NOTE — TELEPHONE ENCOUNTER
Only got BP down with doubling hyzaar 100/12.5  Will add metoprolol 25 mg to slow heart down  May need to cut back on the losartan id BP goes too low    Mentally feeling better

## 2024-06-24 NOTE — PROGRESS NOTES
Subjective   Patient ID: Neema Mckeon is a 59 y.o. female who presents for No chief complaint on file..  HPI    Review of Systems    Objective   Physical Exam    Assessment/Plan            Kirill Gil DO 06/24/24 12:55 PM

## 2024-06-24 NOTE — TELEPHONE ENCOUNTER
STILL NOT FEELING THAT GREAT, WHEN SHE'S UP AND MOVING HER BP /111 PULSE 111\SITTING /80 PULSE STILL HIGH   SHE WAS WONDERING IF YOU CAN CALL HER HER IN SOMETHING FOR THIS OR NO

## 2024-07-03 ENCOUNTER — HOSPITAL ENCOUNTER (OUTPATIENT)
Dept: RADIOLOGY | Facility: HOSPITAL | Age: 59
Discharge: HOME | End: 2024-07-03
Payer: MEDICARE

## 2024-07-03 DIAGNOSIS — R42 DIZZINESS: ICD-10-CM

## 2024-07-03 PROCEDURE — 70450 CT HEAD/BRAIN W/O DYE: CPT | Performed by: RADIOLOGY

## 2024-07-03 PROCEDURE — 70450 CT HEAD/BRAIN W/O DYE: CPT

## 2024-07-15 DIAGNOSIS — I10 PRIMARY HYPERTENSION: ICD-10-CM

## 2024-07-15 DIAGNOSIS — F51.01 IDIOPATHIC INSOMNIA: ICD-10-CM

## 2024-07-15 RX ORDER — LOSARTAN POTASSIUM AND HYDROCHLOROTHIAZIDE 12.5; 1 MG/1; MG/1
1 TABLET ORAL DAILY
Qty: 90 TABLET | Refills: 0 | Status: SHIPPED | OUTPATIENT
Start: 2024-07-15 | End: 2024-07-17 | Stop reason: ALTCHOICE

## 2024-07-15 RX ORDER — TRAZODONE HYDROCHLORIDE 50 MG/1
50-150 TABLET ORAL NIGHTLY PRN
Qty: 180 TABLET | Refills: 0 | Status: SHIPPED | OUTPATIENT
Start: 2024-07-15

## 2024-07-17 ENCOUNTER — OFFICE VISIT (OUTPATIENT)
Dept: PRIMARY CARE | Facility: CLINIC | Age: 59
End: 2024-07-17
Payer: MEDICARE

## 2024-07-17 VITALS
BODY MASS INDEX: 31.18 KG/M2 | OXYGEN SATURATION: 98 % | WEIGHT: 176 LBS | SYSTOLIC BLOOD PRESSURE: 128 MMHG | HEIGHT: 63 IN | HEART RATE: 81 BPM | DIASTOLIC BLOOD PRESSURE: 62 MMHG

## 2024-07-17 DIAGNOSIS — R31.21 ASYMPTOMATIC MICROSCOPIC HEMATURIA: ICD-10-CM

## 2024-07-17 DIAGNOSIS — R00.2 PALPITATIONS: ICD-10-CM

## 2024-07-17 DIAGNOSIS — D69.6 THROMBOCYTOPENIA (CMS-HCC): ICD-10-CM

## 2024-07-17 DIAGNOSIS — Z00.00 ROUTINE GENERAL MEDICAL EXAMINATION AT HEALTH CARE FACILITY: Primary | ICD-10-CM

## 2024-07-17 DIAGNOSIS — F33.1 MDD (MAJOR DEPRESSIVE DISORDER), RECURRENT EPISODE, MODERATE (MULTI): ICD-10-CM

## 2024-07-17 DIAGNOSIS — F51.01 IDIOPATHIC INSOMNIA: ICD-10-CM

## 2024-07-17 DIAGNOSIS — M54.42 ACUTE LEFT-SIDED LOW BACK PAIN WITH LEFT-SIDED SCIATICA: ICD-10-CM

## 2024-07-17 DIAGNOSIS — I10 PRIMARY HYPERTENSION: ICD-10-CM

## 2024-07-17 DIAGNOSIS — G35 MULTIPLE SCLEROSIS (MULTI): ICD-10-CM

## 2024-07-17 DIAGNOSIS — G43.009 MIGRAINE WITHOUT AURA AND WITHOUT STATUS MIGRAINOSUS, NOT INTRACTABLE: ICD-10-CM

## 2024-07-17 LAB
BASOPHILS # BLD AUTO: 0.01 X10*3/UL (ref 0–0.1)
BASOPHILS NFR BLD AUTO: 0.1 %
EOSINOPHIL # BLD AUTO: 0.13 X10*3/UL (ref 0–0.7)
EOSINOPHIL NFR BLD AUTO: 1.7 %
ERYTHROCYTE [DISTWIDTH] IN BLOOD BY AUTOMATED COUNT: 12 % (ref 11.5–14.5)
HCT VFR BLD AUTO: 43.4 % (ref 36–46)
HGB BLD-MCNC: 14.8 G/DL (ref 12–16)
IMM GRANULOCYTES # BLD AUTO: 0.02 X10*3/UL (ref 0–0.7)
IMM GRANULOCYTES NFR BLD AUTO: 0.3 % (ref 0–0.9)
LYMPHOCYTES # BLD AUTO: 2.91 X10*3/UL (ref 1.2–4.8)
LYMPHOCYTES NFR BLD AUTO: 37.8 %
MCH RBC QN AUTO: 30.7 PG (ref 26–34)
MCHC RBC AUTO-ENTMCNC: 34.1 G/DL (ref 32–36)
MCV RBC AUTO: 90 FL (ref 80–100)
MONOCYTES # BLD AUTO: 0.71 X10*3/UL (ref 0.1–1)
MONOCYTES NFR BLD AUTO: 9.2 %
NEUTROPHILS # BLD AUTO: 3.92 X10*3/UL (ref 1.2–7.7)
NEUTROPHILS NFR BLD AUTO: 50.9 %
NRBC BLD-RTO: 0 /100 WBCS (ref 0–0)
PLATELET # BLD AUTO: 198 X10*3/UL (ref 150–450)
POC APPEARANCE, URINE: CLEAR
POC BILIRUBIN, URINE: NEGATIVE
POC BLOOD, URINE: ABNORMAL
POC COLOR, URINE: YELLOW
POC GLUCOSE, URINE: NEGATIVE MG/DL
POC KETONES, URINE: NEGATIVE MG/DL
POC LEUKOCYTES, URINE: ABNORMAL
POC NITRITE,URINE: NEGATIVE
POC PH, URINE: 5.5 PH
POC PROTEIN, URINE: NEGATIVE MG/DL
POC SPECIFIC GRAVITY, URINE: 1.02
POC UROBILINOGEN, URINE: 0.2 EU/DL
RBC # BLD AUTO: 4.82 X10*6/UL (ref 4–5.2)
WBC # BLD AUTO: 7.7 X10*3/UL (ref 4.4–11.3)

## 2024-07-17 PROCEDURE — 36415 COLL VENOUS BLD VENIPUNCTURE: CPT

## 2024-07-17 PROCEDURE — 85025 COMPLETE CBC W/AUTO DIFF WBC: CPT

## 2024-07-17 RX ORDER — LOSARTAN POTASSIUM AND HYDROCHLOROTHIAZIDE 12.5; 5 MG/1; MG/1
1 TABLET ORAL 2 TIMES DAILY
Qty: 60 TABLET | Refills: 11 | Status: SHIPPED | OUTPATIENT
Start: 2024-07-17 | End: 2025-07-17

## 2024-07-17 RX ORDER — OXYCODONE AND ACETAMINOPHEN 5; 325 MG/1; MG/1
1 TABLET ORAL EVERY 6 HOURS PRN
Qty: 15 TABLET | Refills: 0 | Status: SHIPPED | OUTPATIENT
Start: 2024-07-17 | End: 2024-07-24

## 2024-07-17 RX ORDER — METOPROLOL SUCCINATE 25 MG/1
25 TABLET, EXTENDED RELEASE ORAL DAILY
Qty: 90 TABLET | Refills: 3 | Status: SHIPPED | OUTPATIENT
Start: 2024-07-17 | End: 2025-07-17

## 2024-07-17 ASSESSMENT — ACTIVITIES OF DAILY LIVING (ADL)
DRESSING: INDEPENDENT
DOING_HOUSEWORK: INDEPENDENT
BATHING: INDEPENDENT
GROCERY_SHOPPING: INDEPENDENT
TAKING_MEDICATION: INDEPENDENT
MANAGING_FINANCES: INDEPENDENT

## 2024-07-17 NOTE — PROGRESS NOTES
Subjective   Reason for Visit: Neema Mckeon is an 59 y.o. female here for a Medicare Wellness visit.     Past Medical, Surgical, and Family History reviewed and updated in chart.         HPI  Seems like either pulse or blood pressure will go high at night  When BP will go up then HA will hurt  Will occasional get pain in CP when BP being goofy  No SOB, palpitations  No numbness, weakness, vision changes  Dizziness when bends over and stand back up    Ophtho- 2021  Dentist- been awhile  Colonoscopy-  DEION-  Cologuard- ordered  UA/Micro- 7/17/24  Mammo- ordered  DEXA-  PAP- due  Lung CT-  Coronary Calcium CT Score-  AAA-  EKG-  Prevnar-  Flu- 12/23  Shingrix-  Td-  Hep C-  Advance Directives-  ETOH screen- 7/17/24  CV risk- 12/4/23  AWV- 7/17/24  MDD screen- 7/17/24    Patient Care Team:  Kirill Gil DO as PCP - General  Kirill Gil DO as PCP - [employer] PCP  DO Veronica Sagastume MD as Referring Physician (Neurology)     Review of Systems   Constitutional:  Negative for chills, fatigue and fever.   HENT:  Negative for congestion, ear discharge, ear pain, hearing loss, nosebleeds, sore throat, tinnitus and trouble swallowing.    Eyes:  Negative for pain, redness and visual disturbance.   Respiratory:  Negative for cough, chest tightness, shortness of breath and wheezing.    Cardiovascular:  Positive for chest pain. Negative for palpitations and leg swelling.   Gastrointestinal:  Negative for abdominal pain, blood in stool, constipation, diarrhea, nausea and vomiting.   Endocrine: Negative for cold intolerance, heat intolerance, polydipsia, polyphagia and polyuria.   Genitourinary:  Negative for dysuria, frequency, hematuria and urgency.   Musculoskeletal:  Negative for arthralgias, back pain and gait problem.   Skin:  Negative for color change and rash.   Neurological:  Positive for dizziness and headaches. Negative for tremors, syncope, weakness and numbness.   Hematological:   "Negative for adenopathy. Does not bruise/bleed easily.   Psychiatric/Behavioral:  Negative for dysphoric mood. The patient is not nervous/anxious.        Objective   Vitals:  /62   Pulse 81   Ht 1.6 m (5' 3\")   Wt 79.8 kg (176 lb)   SpO2 98%   BMI 31.18 kg/m²       Physical Exam  Vitals and nursing note reviewed.   Constitutional:       General: She is not in acute distress.     Appearance: Normal appearance. She is not ill-appearing.   HENT:      Head: Normocephalic and atraumatic.      Right Ear: Tympanic membrane, ear canal and external ear normal.      Left Ear: Tympanic membrane, ear canal and external ear normal.      Nose: Nose normal.      Mouth/Throat:      Mouth: Mucous membranes are moist.      Pharynx: Oropharynx is clear.   Eyes:      Extraocular Movements: Extraocular movements intact.      Conjunctiva/sclera: Conjunctivae normal.      Pupils: Pupils are equal, round, and reactive to light.   Neck:      Vascular: No carotid bruit.   Cardiovascular:      Rate and Rhythm: Normal rate and regular rhythm.      Pulses: Normal pulses.      Heart sounds: Normal heart sounds.   Pulmonary:      Effort: Pulmonary effort is normal.      Breath sounds: Normal breath sounds.   Abdominal:      General: Abdomen is flat. Bowel sounds are normal.      Palpations: Abdomen is soft.      Tenderness: Negative signs include Tatum's sign and McBurney's sign.   Musculoskeletal:         General: Normal range of motion.      Cervical back: Normal range of motion and neck supple.   Lymphadenopathy:      Cervical: No cervical adenopathy.   Skin:     Capillary Refill: Capillary refill takes less than 2 seconds.   Neurological:      General: No focal deficit present.      Mental Status: She is alert and oriented to person, place, and time.   Psychiatric:         Mood and Affect: Mood normal.         Behavior: Behavior normal.         Assessment/Plan   Problem List Items Addressed This Visit       Primary hypertension    " Asymptomatic microscopic hematuria    Relevant Orders    POCT UA Automated manually resulted (Completed)     Other Visit Diagnoses       Routine general medical examination at health care facility    -  Primary    Relevant Medications    losartan-hydrochlorothiazide (Hyzaar) 50-12.5 mg tablet    Thrombocytopenia (CMS-HCC)        Relevant Orders    CBC and Auto Differential (Completed)    Palpitations        Relevant Medications    metoprolol succinate XL (Toprol-XL) 25 mg 24 hr tablet    Acute left-sided low back pain with left-sided sciatica        Relevant Medications    oxyCODONE-acetaminophen (Percocet) 5-325 mg tablet        Renewed/continued rest of medications  Checked  labs  Updated Health Maintenance in HPI section  HTN, controlled- continue meds, low sodium diet     Obesity- caloric restriction, increase CV exercise     AR- zyrtec, avoid allergens     MDD- cymbalta, CV exercise     MS- prednisone prn, follow with neurology     Migraine- avoid triggers, OTC meds prn     Vitamin D Def- follow level, supplement     Insomnia- sleep hygiene, trazodone    LBP- heat, perocet prn    Palpitations- limit caffeine, CV exercise    Thrombocytopenia- check CBC    Hematuria- check UA

## 2024-07-18 DIAGNOSIS — R31.21 ASYMPTOMATIC MICROSCOPIC HEMATURIA: Primary | ICD-10-CM

## 2024-07-18 ASSESSMENT — ENCOUNTER SYMPTOMS
SHORTNESS OF BREATH: 0
CONSTIPATION: 0
POLYPHAGIA: 0
NAUSEA: 0
BRUISES/BLEEDS EASILY: 0
FEVER: 0
COUGH: 0
EYE REDNESS: 0
WEAKNESS: 0
ADENOPATHY: 0
HEMATURIA: 0
NUMBNESS: 0
HEADACHES: 1
TROUBLE SWALLOWING: 0
ARTHRALGIAS: 0
PALPITATIONS: 0
DYSPHORIC MOOD: 0
POLYDIPSIA: 0
DIZZINESS: 1
BACK PAIN: 0
DIARRHEA: 0
EYE PAIN: 0
NERVOUS/ANXIOUS: 0
COLOR CHANGE: 0
CHILLS: 0
SORE THROAT: 0
FREQUENCY: 0
WHEEZING: 0
BLOOD IN STOOL: 0
DYSURIA: 0
TREMORS: 0
VOMITING: 0
ABDOMINAL PAIN: 0
FATIGUE: 0
CHEST TIGHTNESS: 0

## 2024-07-23 ENCOUNTER — HOSPITAL ENCOUNTER (EMERGENCY)
Facility: HOSPITAL | Age: 59
Discharge: HOME | End: 2024-07-23
Attending: EMERGENCY MEDICINE
Payer: MEDICARE

## 2024-07-23 ENCOUNTER — APPOINTMENT (OUTPATIENT)
Dept: RADIOLOGY | Facility: HOSPITAL | Age: 59
End: 2024-07-23
Payer: MEDICARE

## 2024-07-23 ENCOUNTER — APPOINTMENT (OUTPATIENT)
Dept: CARDIOLOGY | Facility: HOSPITAL | Age: 59
End: 2024-07-23
Payer: MEDICARE

## 2024-07-23 VITALS
SYSTOLIC BLOOD PRESSURE: 142 MMHG | BODY MASS INDEX: 31.18 KG/M2 | WEIGHT: 176 LBS | TEMPERATURE: 96.8 F | HEIGHT: 63 IN | DIASTOLIC BLOOD PRESSURE: 71 MMHG | HEART RATE: 62 BPM | RESPIRATION RATE: 15 BRPM | OXYGEN SATURATION: 100 %

## 2024-07-23 DIAGNOSIS — R07.9 ACUTE CHEST PAIN: Primary | ICD-10-CM

## 2024-07-23 DIAGNOSIS — R09.1 PLEURISY: ICD-10-CM

## 2024-07-23 LAB
ALBUMIN SERPL BCP-MCNC: 4.4 G/DL (ref 3.4–5)
ALP SERPL-CCNC: 90 U/L (ref 33–136)
ALT SERPL W P-5'-P-CCNC: 17 U/L (ref 7–45)
ANION GAP SERPL CALC-SCNC: 11 MMOL/L (ref 10–20)
AST SERPL W P-5'-P-CCNC: 18 U/L (ref 9–39)
BASOPHILS # BLD AUTO: 0.01 X10*3/UL (ref 0–0.1)
BASOPHILS NFR BLD AUTO: 0.1 %
BILIRUB SERPL-MCNC: 0.7 MG/DL (ref 0–1.2)
BNP SERPL-MCNC: 19 PG/ML (ref 0–99)
BUN SERPL-MCNC: 11 MG/DL (ref 6–23)
CALCIUM SERPL-MCNC: 9.4 MG/DL (ref 8.6–10.3)
CARDIAC TROPONIN I PNL SERPL HS: 3 NG/L (ref 0–13)
CARDIAC TROPONIN I PNL SERPL HS: 3 NG/L (ref 0–13)
CHLORIDE SERPL-SCNC: 99 MMOL/L (ref 98–107)
CO2 SERPL-SCNC: 32 MMOL/L (ref 21–32)
CREAT SERPL-MCNC: 0.77 MG/DL (ref 0.5–1.05)
EGFRCR SERPLBLD CKD-EPI 2021: 88 ML/MIN/1.73M*2
EOSINOPHIL # BLD AUTO: 0.1 X10*3/UL (ref 0–0.7)
EOSINOPHIL NFR BLD AUTO: 1.3 %
ERYTHROCYTE [DISTWIDTH] IN BLOOD BY AUTOMATED COUNT: 11.9 % (ref 11.5–14.5)
GLUCOSE SERPL-MCNC: 99 MG/DL (ref 74–99)
HCT VFR BLD AUTO: 40.6 % (ref 36–46)
HGB BLD-MCNC: 14.1 G/DL (ref 12–16)
IMM GRANULOCYTES # BLD AUTO: 0.02 X10*3/UL (ref 0–0.7)
IMM GRANULOCYTES NFR BLD AUTO: 0.3 % (ref 0–0.9)
LYMPHOCYTES # BLD AUTO: 2.68 X10*3/UL (ref 1.2–4.8)
LYMPHOCYTES NFR BLD AUTO: 34.9 %
MAGNESIUM SERPL-MCNC: 2.12 MG/DL (ref 1.6–2.4)
MCH RBC QN AUTO: 31.4 PG (ref 26–34)
MCHC RBC AUTO-ENTMCNC: 34.7 G/DL (ref 32–36)
MCV RBC AUTO: 90 FL (ref 80–100)
MONOCYTES # BLD AUTO: 0.6 X10*3/UL (ref 0.1–1)
MONOCYTES NFR BLD AUTO: 7.8 %
NEUTROPHILS # BLD AUTO: 4.26 X10*3/UL (ref 1.2–7.7)
NEUTROPHILS NFR BLD AUTO: 55.6 %
NRBC BLD-RTO: 0 /100 WBCS (ref 0–0)
PLATELET # BLD AUTO: 183 X10*3/UL (ref 150–450)
POTASSIUM SERPL-SCNC: 3.6 MMOL/L (ref 3.5–5.3)
PROT SERPL-MCNC: 6.6 G/DL (ref 6.4–8.2)
RBC # BLD AUTO: 4.49 X10*6/UL (ref 4–5.2)
SODIUM SERPL-SCNC: 138 MMOL/L (ref 136–145)
WBC # BLD AUTO: 7.7 X10*3/UL (ref 4.4–11.3)

## 2024-07-23 PROCEDURE — 99284 EMERGENCY DEPT VISIT MOD MDM: CPT | Mod: 25

## 2024-07-23 PROCEDURE — 80053 COMPREHEN METABOLIC PANEL: CPT

## 2024-07-23 PROCEDURE — 83735 ASSAY OF MAGNESIUM: CPT

## 2024-07-23 PROCEDURE — 84484 ASSAY OF TROPONIN QUANT: CPT

## 2024-07-23 PROCEDURE — 85025 COMPLETE CBC W/AUTO DIFF WBC: CPT

## 2024-07-23 PROCEDURE — 36415 COLL VENOUS BLD VENIPUNCTURE: CPT

## 2024-07-23 PROCEDURE — 71045 X-RAY EXAM CHEST 1 VIEW: CPT | Performed by: RADIOLOGY

## 2024-07-23 PROCEDURE — 93005 ELECTROCARDIOGRAM TRACING: CPT

## 2024-07-23 PROCEDURE — 71045 X-RAY EXAM CHEST 1 VIEW: CPT

## 2024-07-23 PROCEDURE — 83880 ASSAY OF NATRIURETIC PEPTIDE: CPT

## 2024-07-23 ASSESSMENT — PAIN - FUNCTIONAL ASSESSMENT: PAIN_FUNCTIONAL_ASSESSMENT: 0-10

## 2024-07-23 ASSESSMENT — PAIN SCALES - GENERAL
PAINLEVEL_OUTOF10: 1
PAINLEVEL_OUTOF10: 1
PAINLEVEL_OUTOF10: 0 - NO PAIN

## 2024-07-23 ASSESSMENT — LIFESTYLE VARIABLES
EVER FELT BAD OR GUILTY ABOUT YOUR DRINKING: NO
TOTAL SCORE: 0
HAVE YOU EVER FELT YOU SHOULD CUT DOWN ON YOUR DRINKING: NO
HAVE PEOPLE ANNOYED YOU BY CRITICIZING YOUR DRINKING: NO
EVER HAD A DRINK FIRST THING IN THE MORNING TO STEADY YOUR NERVES TO GET RID OF A HANGOVER: NO

## 2024-07-23 ASSESSMENT — COLUMBIA-SUICIDE SEVERITY RATING SCALE - C-SSRS
6. HAVE YOU EVER DONE ANYTHING, STARTED TO DO ANYTHING, OR PREPARED TO DO ANYTHING TO END YOUR LIFE?: NO
1. IN THE PAST MONTH, HAVE YOU WISHED YOU WERE DEAD OR WISHED YOU COULD GO TO SLEEP AND NOT WAKE UP?: NO
2. HAVE YOU ACTUALLY HAD ANY THOUGHTS OF KILLING YOURSELF?: NO

## 2024-07-23 ASSESSMENT — PAIN DESCRIPTION - DESCRIPTORS: DESCRIPTORS: PRESSURE

## 2024-07-23 NOTE — ED TRIAGE NOTES
Pt presents to ED via EMS for chest pain.  Pt states her chest pain came on suddenly and felt like pressure in the left side of her chest that radiated down her left arm.  Pt states she also felt nauseous.   Pt denies dizziness, shortness of breath and has no other complaints at this time.

## 2024-07-23 NOTE — ED PROVIDER NOTES
HPI   Chief Complaint   Patient presents with    Chest Pain     Pt presents to ED via EMS for chest pain.  Pt states her chest pain came on suddenly and felt like pressure in the left side of her chest that radiated down her left arm.  Pt states she also felt nauseous.   Pt denies dizziness, shortness of breath and has no other complaints at this time.         59-year-old female past medical history of hypertension and MS who presents today for acute onset chest pain.  Patient states that she had this pain started rapidly, is associated with nausea, but the pain does not radiate anywhere, is not associate with shortness of breath.  She denies any vomiting diarrhea abdominal pain, denies any new swelling in her legs, cough, sore throat, headache, fever, changes in vision or hearing.  She does endorse that she was recently taking a prednisone burst for possible MS flare which did not make her suicidal as well as homicidal and also made her sugar incredibly high and made her blood pressure quite high.  She states that she stopped taking this a few days ago.      History provided by:  Patient and EMS personnel   used: No            Patient History   Past Medical History:   Diagnosis Date    Acute bronchitis due to other specified organisms 09/20/2021    Acute bronchitis due to other specified organisms    Acute low back pain 03/13/2023    Acute maxillary sinusitis, unspecified 11/15/2019    Acute maxillary sinusitis    Acute nasopharyngitis (common cold) 11/14/2019    Nasopharyngitis    Acute right-sided low back pain with left-sided sciatica 03/13/2023    Cervical radicular pain 03/13/2023    Chest pain 03/13/2023    Headache, unspecified 06/24/2020    Intractable headache    Infrapatellar bursitis of left knee 03/13/2023    Leg pain, bilateral 03/13/2023    Neoplasm of uncertain behavior of skin of upper extremity 03/13/2023    Other acute sinusitis 02/02/2022    Other acute sinusitis, recurrence  not specified    Other psychoactive substance use, 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) 06/20/2019    Drug-induced insomnia    Otitis media, unspecified, right ear 03/24/2018    Acute right otitis media    Personal history of other diseases of the respiratory system 11/23/2020    History of acute sinusitis    Personal history of other diseases of the respiratory system 11/04/2019    History of sinusitis    Personal history of other diseases of the respiratory system 02/15/2019    History of acute sinusitis    Personal history of other infectious and parasitic diseases 11/12/2020    History of viral infection    Personal history of other specified conditions 03/10/2020    History of epigastric pain    Personal history of other specified conditions 11/25/2019    History of epigastric pain    Skin lesion of left upper extremity 03/13/2023    Voice disturbance 03/13/2023    Weakness 03/13/2023     Past Surgical History:   Procedure Laterality Date    CERVICAL FUSION  05/20/2015    Cervical Vertebral Fusion    CHOLECYSTECTOMY  05/20/2015    Cholecystectomy    CT ABDOMEN PELVIS ANGIOGRAM W AND/OR WO IV CONTRAST  9/13/2023    CT ABDOMEN PELVIS ANGIOGRAM W AND/OR WO IV CONTRAST 9/13/2023 [id number] CT    HYSTERECTOMY  05/20/2015    Hysterectomy    MR HEAD ANGIO WO IV CONTRAST  10/9/2019    MR HEAD ANGIO WO IV CONTRAST 10/9/2019 [facility]     No family history on file.  Social History     Tobacco Use    Smoking status: Never    Smokeless tobacco: Never   Substance Use Topics    Alcohol use: Never    Drug use: Never       Physical Exam   ED Triage Vitals [07/23/24 1130]   Temperature Heart Rate Respirations BP   36 °C (96.8 °F) 71 16 (!) 160/91      Pulse Ox Temp Source Heart Rate Source Patient Position   100 % Skin -- --      BP Location FiO2 (%)     -- --       Physical Exam  Constitutional:       General: She is not in acute distress.     Appearance: Normal appearance. She is  not ill-appearing or diaphoretic.   HENT:      Head: Normocephalic and atraumatic.      Mouth/Throat:      Mouth: Mucous membranes are moist.      Pharynx: No oropharyngeal exudate or posterior oropharyngeal erythema.   Eyes:      General: No scleral icterus.     Extraocular Movements: Extraocular movements intact.      Pupils: Pupils are equal, round, and reactive to light.   Cardiovascular:      Rate and Rhythm: Normal rate and regular rhythm.      Pulses: Normal pulses.           Radial pulses are 2+ on the right side and 2+ on the left side.        Dorsalis pedis pulses are 2+ on the right side and 2+ on the left side.        Posterior tibial pulses are 2+ on the right side and 2+ on the left side.      Heart sounds: Normal heart sounds. No murmur heard.     No systolic murmur is present.      No gallop.   Pulmonary:      Effort: Pulmonary effort is normal. No respiratory distress.      Breath sounds: Normal breath sounds. No stridor. No decreased breath sounds, wheezing, rhonchi or rales.   Chest:      Chest wall: No tenderness.   Abdominal:      General: Bowel sounds are normal. There is no distension.      Palpations: Abdomen is soft. There is no mass.      Tenderness: There is no abdominal tenderness. There is no guarding or rebound.      Hernia: No hernia is present.   Musculoskeletal:         General: No swelling, deformity or signs of injury. Normal range of motion.      Cervical back: Normal range of motion and neck supple. No tenderness.   Skin:     General: Skin is warm.      Capillary Refill: Capillary refill takes less than 2 seconds.      Findings: No erythema, lesion or rash.   Neurological:      General: No focal deficit present.      Mental Status: She is alert and oriented to person, place, and time. Mental status is at baseline.      Cranial Nerves: No cranial nerve deficit.      Motor: No weakness.   Psychiatric:         Mood and Affect: Mood normal.         Behavior: Behavior normal.            ED Course & MDM   Diagnoses as of 07/23/24 1952   Acute chest pain   Pleurisy                       William Coma Scale Score: 15                        Medical Decision Making  59-year-old female past medical history of hypertension and MS who presents today for acute onset chest pain.  Patient's chest pain improved significantly prior to arrival, so my concern for this being cardiac in nature  is relatively low.  I will get troponin as well as BNP as well as basic labs.  I do not anticipate that the patient's labs will be significantly abnormal.  Her CBC demonstrates no real significant abnormality, her troponin was 3 and on repeat was also 3.  Her BNP was not elevated.  Her chest x-ray was clear.  Her EKG demonstrated normal sinus rhythm.  Given this as well as the fact the patient's pain improved with Tylenol, we will discharge her home.  I did discuss with her that she may have had an episode of pleurisy secondary to her autoimmune conditions, which can be treated at home with Tylenol ibuprofen and lidocaine patches.  She states that she has all of these, does not require any prescriptions.  Given this, we will discharge her home at this time.  All questions were answered prior to discharge, return precaution provided.  I did also recommend that she follow-up with a cardiologist for cardiac restratification if she were concerned.        Procedure  Procedures     Braxton Diaz MD  Resident  07/23/24 1957

## 2024-07-23 NOTE — DISCHARGE INSTRUCTIONS
You were seen today for chest pain, which resolved during your stay. This may be pleurisy, or pleuritic irritation secondary to your inflammatory diseases. You do not appear to have anything going on with your heart, however, given you risk factors, you should likely see a cardiologist for risk stratification. Please come back if the pain worsens, begins to radiate into your back or neck, is worse with exertion, or any other concerns.

## 2024-07-24 LAB
ATRIAL RATE: 60 BPM
ATRIAL RATE: 63 BPM
P AXIS: 37 DEGREES
P AXIS: 39 DEGREES
P OFFSET: 178 MS
P OFFSET: 183 MS
P ONSET: 130 MS
P ONSET: 132 MS
PR INTERVAL: 186 MS
PR INTERVAL: 186 MS
Q ONSET: 223 MS
Q ONSET: 225 MS
QRS COUNT: 10 BEATS
QRS COUNT: 10 BEATS
QRS DURATION: 72 MS
QRS DURATION: 76 MS
QT INTERVAL: 412 MS
QT INTERVAL: 438 MS
QTC CALCULATION(BAZETT): 421 MS
QTC CALCULATION(BAZETT): 438 MS
QTC FREDERICIA: 418 MS
QTC FREDERICIA: 438 MS
R AXIS: -11 DEGREES
R AXIS: -12 DEGREES
T AXIS: 24 DEGREES
T AXIS: 33 DEGREES
T OFFSET: 429 MS
T OFFSET: 444 MS
VENTRICULAR RATE: 60 BPM
VENTRICULAR RATE: 63 BPM

## 2024-07-25 ENCOUNTER — HOSPITAL ENCOUNTER (OUTPATIENT)
Dept: RADIOLOGY | Facility: HOSPITAL | Age: 59
Discharge: HOME | End: 2024-07-25
Payer: MEDICARE

## 2024-07-25 DIAGNOSIS — Z12.31 ENCOUNTER FOR SCREENING MAMMOGRAM FOR BREAST CANCER: ICD-10-CM

## 2024-07-25 PROCEDURE — 77067 SCR MAMMO BI INCL CAD: CPT

## 2024-07-28 ASSESSMENT — HEART SCORE
RISK FACTORS: 1-2 RISK FACTORS
ECG: NORMAL
HISTORY: SLIGHTLY SUSPICIOUS
AGE: 45-64
HEART SCORE: 2
TROPONIN: LESS THAN OR EQUAL TO NORMAL LIMIT

## 2024-07-31 ENCOUNTER — APPOINTMENT (OUTPATIENT)
Dept: PRIMARY CARE | Facility: CLINIC | Age: 59
End: 2024-07-31
Payer: MEDICARE

## 2024-08-12 ENCOUNTER — TELEPHONE (OUTPATIENT)
Dept: PRIMARY CARE | Facility: CLINIC | Age: 59
End: 2024-08-12
Payer: MEDICARE

## 2024-08-12 DIAGNOSIS — M54.42 ACUTE LEFT-SIDED LOW BACK PAIN WITH LEFT-SIDED SCIATICA: ICD-10-CM

## 2024-08-12 RX ORDER — OXYCODONE AND ACETAMINOPHEN 5; 325 MG/1; MG/1
1 TABLET ORAL EVERY 6 HOURS PRN
Qty: 15 TABLET | Refills: 0 | Status: SHIPPED | OUTPATIENT
Start: 2024-08-12 | End: 2024-08-19

## 2024-08-13 ENCOUNTER — OFFICE VISIT (OUTPATIENT)
Dept: CARDIOLOGY | Facility: HOSPITAL | Age: 59
End: 2024-08-13
Payer: MEDICARE

## 2024-08-13 DIAGNOSIS — Z00.00 ROUTINE GENERAL MEDICAL EXAMINATION AT HEALTH CARE FACILITY: ICD-10-CM

## 2024-08-13 DIAGNOSIS — R07.9 CHEST PAIN, UNSPECIFIED TYPE: Primary | ICD-10-CM

## 2024-08-13 DIAGNOSIS — R00.2 PALPITATIONS: ICD-10-CM

## 2024-08-13 PROCEDURE — 99214 OFFICE O/P EST MOD 30 MIN: CPT | Performed by: NURSE PRACTITIONER

## 2024-08-13 PROCEDURE — 3079F DIAST BP 80-89 MM HG: CPT | Performed by: NURSE PRACTITIONER

## 2024-08-13 PROCEDURE — 99204 OFFICE O/P NEW MOD 45 MIN: CPT | Performed by: NURSE PRACTITIONER

## 2024-08-13 PROCEDURE — 1036F TOBACCO NON-USER: CPT | Performed by: NURSE PRACTITIONER

## 2024-08-13 PROCEDURE — 3077F SYST BP >= 140 MM HG: CPT | Performed by: NURSE PRACTITIONER

## 2024-08-13 RX ORDER — METOPROLOL SUCCINATE 50 MG/1
50 TABLET, EXTENDED RELEASE ORAL DAILY
Qty: 90 TABLET | Refills: 3 | Status: SHIPPED | OUTPATIENT
Start: 2024-08-13 | End: 2025-08-13

## 2024-08-13 RX ORDER — LOSARTAN POTASSIUM AND HYDROCHLOROTHIAZIDE 12.5; 5 MG/1; MG/1
2 TABLET ORAL DAILY
Start: 2024-08-13 | End: 2025-08-13

## 2024-08-14 VITALS
OXYGEN SATURATION: 97 % | HEART RATE: 93 BPM | DIASTOLIC BLOOD PRESSURE: 82 MMHG | BODY MASS INDEX: 31.52 KG/M2 | SYSTOLIC BLOOD PRESSURE: 148 MMHG | WEIGHT: 177.91 LBS

## 2024-08-14 NOTE — PROGRESS NOTES
Referred by Dr. Diaz for ER follow up, hypertension      History Of Present Illness:    Neema Mckeon is a 59 y.o. female with h/o multiple sclerosis, htn, palpitations, sciatica, thrombocytopenia presenting today to the [facility] for evaluation of chest pain and hypertension.  In June, she was treated with steroid therapy for a flare of MS.  Subsequently she developed severe reaction to prednisone including suicidal and homicidal thoughts as well as thrombocytopenia-- symptoms resolved after stopping steroid and platelets normalized.  In July, she was evaluated in the ER for chest pain-- pressure sensation that radiates to the left arm.  Pain was thought to be inflammatory related-- steroids were avoided and patient was advised to use NSAIDS.  She was hypertensive in the ER-- also reported having heart racing palpitations. She was started on Toprol XL 25mg daily which has helped to resolve palpitations.  Troponin negative x2.  She continues to have events of midsternal chest pressure with occasional radiation to the left arm.  Symptom is unrelated to exertion, position or meals.        Past Medical History:  She has a past medical history of Acute bronchitis due to other specified organisms (09/20/2021), Acute low back pain (03/13/2023), Acute maxillary sinusitis, unspecified (11/15/2019), Acute nasopharyngitis (common cold) (11/14/2019), Acute right-sided low back pain with left-sided sciatica (03/13/2023), Cervical radicular pain (03/13/2023), Chest pain (03/13/2023), Headache, unspecified (06/24/2020), Infrapatellar bursitis of left knee (03/13/2023), Leg pain, bilateral (03/13/2023), Neoplasm of uncertain behavior of skin of upper extremity (03/13/2023), Other acute sinusitis (02/02/2022), Other psychoactive substance use, 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    Past Surgical History:  She has a past surgical history that includes Cervical fusion (05/20/2015); Cholecystectomy (05/20/2015); Hysterectomy (05/20/2015); MR angio head wo IV contrast (10/9/2019); and CT angio abdomen pelvis w and or wo IV IV contrast (9/13/2023).      Social History:  She reports that she has never smoked. She has never used smokeless tobacco. She reports that she does not drink alcohol and does not use drugs.    Family History:  Family History   Problem Relation Name Age of Onset    Breast cancer Mother's Sister  60        Allergies:  Cefaclor, Doxycycline, Ibuprofen, Levofloxacin, Morphine, Prednisone (bulk), and Sulfamethoxazole-trimethoprim    Outpatient Medications:  Current Outpatient Medications   Medication Instructions    DULoxetine (CYMBALTA) 30 mg, oral, Daily, Do not crush or chew    losartan-hydrochlorothiazide (Hyzaar) 50-12.5 mg tablet 2 tablets, oral, Daily    metoprolol succinate XL (TOPROL-XL) 50 mg, oral, Daily, Do not crush or chew.    oxyCODONE-acetaminophen (Percocet) 5-325 mg tablet 1 tablet, oral, Every 6 hours PRN    SUMAtriptan (IMITREX) 100 mg, oral, Once as needed    traZODone (DESYREL)  mg, oral, Nightly PRN        Last Recorded Vitals:  Vitals:    08/13/24 1504 08/14/24 1653   BP: (!) 151/93 148/82   Pulse: 93    SpO2: 97%    Weight: 80.7 kg (177 lb 14.6 oz)        Physical Exam:  Constitutional: Pleasant, Awake/Alert/Oriented to person place and time. No  distress  Head: Atraumatic, Normocephalic  Eyes: EOMI. JOSIANE  Neck: No JVD  Cardiovascular: Regular rate and rhythm, S1, S2. No extra heart sounds or murmurs  Respiratory: Clear to auscultation bilaterally. No wheezing, rales or rhonchi. Good chest wall expansion  Abdomen: Soft, Nontender. Bowel sounds appreciated  Musculoskeletal: ROM intact. Muscle strength grossly intact upper and lower extremities 5/5.   Neurological: CNII-XII intact. Sensation grossly intact  Extremities: Warm and dry. No acute rashes and lesions  Psychiatric: Appropriate mood and affect         Last Labs:  CBC -  Lab Results   Component Value Date    WBC 7.7 07/23/2024    HGB 14.1 07/23/2024    HCT 40.6 07/23/2024    MCV 90 07/23/2024     07/23/2024       CMP -  Lab Results   Component Value Date    CALCIUM 9.4 07/23/2024    PROT 6.6 07/23/2024    ALBUMIN 4.4 07/23/2024    AST 18 07/23/2024    ALT 17 07/23/2024    ALKPHOS 90 07/23/2024    BILITOT 0.7 07/23/2024       LIPID PANEL -   Lab Results   Component Value Date    CHOL 231 (H) 06/19/2024    TRIG 126 06/19/2024    HDL 58.1 06/19/2024    CHHDL 4.0 06/19/2024    LDLF 171 (H) 09/11/2023    VLDL 25 06/19/2024    NHDL 173 (H) 06/19/2024       RENAL FUNCTION PANEL -   Lab Results   Component Value Date    GLUCOSE 99 07/23/2024     07/23/2024    K 3.6 07/23/2024    CL 99 07/23/2024    CO2 32 07/23/2024    ANIONGAP 11 07/23/2024    BUN 11 07/23/2024    CREATININE 0.77 07/23/2024    CALCIUM 9.4 07/23/2024    ALBUMIN 4.4 07/23/2024        Lab Results   Component Value Date    BNP 19 07/23/2024    HGBA1C 5.6 06/19/2024       Last Cardiology Tests:  ECG:  ECG 12 lead 07/23/2024  NSR, HR 60bpm       Lab review: I have personally reviewed the laboratory result(s)   Diagnostic review: I have personally reviewed the result(s) of the EKG.    Assessment/Plan   Very pleasant  59 y.o. female with h/o multiple sclerosis, htn, palpitations, sciatica, thrombocytopenia presenting today to the  [facility] for evaluation of chest pain and hypertension.     Plan:  -Recommend obtaining an exercise nuclear stress test for evaluation of stress induced ischemia.   -Increase toprol XL to 50mg daily for improved BP control  -Continue losartan-hydrochlorothiazide 100/25mg daily  -Further follow up pending above testing results.       DAMIEN Paul-CNP

## 2024-08-22 ENCOUNTER — HOSPITAL ENCOUNTER (OUTPATIENT)
Dept: RADIOLOGY | Facility: HOSPITAL | Age: 59
Discharge: HOME | End: 2024-08-22
Payer: MEDICARE

## 2024-08-22 ENCOUNTER — HOSPITAL ENCOUNTER (OUTPATIENT)
Dept: CARDIOLOGY | Facility: HOSPITAL | Age: 59
Discharge: HOME | End: 2024-08-22
Payer: MEDICARE

## 2024-08-22 DIAGNOSIS — R07.9 CHEST PAIN, UNSPECIFIED TYPE: ICD-10-CM

## 2024-08-22 PROCEDURE — 3430000001 HC RX 343 DIAGNOSTIC RADIOPHARMACEUTICALS: Performed by: NURSE PRACTITIONER

## 2024-08-22 PROCEDURE — 78452 HT MUSCLE IMAGE SPECT MULT: CPT

## 2024-08-22 PROCEDURE — A9502 TC99M TETROFOSMIN: HCPCS | Performed by: NURSE PRACTITIONER

## 2024-08-22 PROCEDURE — 93017 CV STRESS TEST TRACING ONLY: CPT

## 2024-08-22 PROCEDURE — 78452 HT MUSCLE IMAGE SPECT MULT: CPT | Performed by: RADIOLOGY

## 2024-09-03 DIAGNOSIS — F33.1 MDD (MAJOR DEPRESSIVE DISORDER), RECURRENT EPISODE, MODERATE (MULTI): ICD-10-CM

## 2024-09-03 RX ORDER — DULOXETIN HYDROCHLORIDE 30 MG/1
30 CAPSULE, DELAYED RELEASE ORAL DAILY
Qty: 90 CAPSULE | Refills: 0 | Status: SHIPPED | OUTPATIENT
Start: 2024-09-03

## 2024-09-06 ENCOUNTER — TELEPHONE (OUTPATIENT)
Dept: PRIMARY CARE | Facility: CLINIC | Age: 59
End: 2024-09-06
Payer: MEDICARE

## 2024-09-06 DIAGNOSIS — M54.42 ACUTE LEFT-SIDED LOW BACK PAIN WITH LEFT-SIDED SCIATICA: ICD-10-CM

## 2024-09-06 RX ORDER — OXYCODONE AND ACETAMINOPHEN 5; 325 MG/1; MG/1
1 TABLET ORAL EVERY 6 HOURS PRN
Qty: 15 TABLET | Refills: 0 | Status: SHIPPED | OUTPATIENT
Start: 2024-09-06 | End: 2024-09-13

## 2024-09-09 ENCOUNTER — APPOINTMENT (OUTPATIENT)
Dept: UROLOGY | Facility: CLINIC | Age: 59
End: 2024-09-09
Payer: MEDICARE

## 2024-09-09 DIAGNOSIS — R31.21 ASYMPTOMATIC MICROSCOPIC HEMATURIA: ICD-10-CM

## 2024-09-09 LAB
POC APPEARANCE, URINE: CLEAR
POC BILIRUBIN, URINE: NEGATIVE
POC BLOOD, URINE: ABNORMAL
POC COLOR, URINE: YELLOW
POC GLUCOSE, URINE: NEGATIVE MG/DL
POC KETONES, URINE: NEGATIVE MG/DL
POC LEUKOCYTES, URINE: NEGATIVE
POC NITRITE,URINE: NEGATIVE
POC PH, URINE: 6 PH
POC PROTEIN, URINE: NEGATIVE MG/DL
POC SPECIFIC GRAVITY, URINE: 1.02
POC UROBILINOGEN, URINE: 0.2 EU/DL

## 2024-09-09 PROCEDURE — 81001 URINALYSIS AUTO W/SCOPE: CPT

## 2024-09-09 PROCEDURE — 99204 OFFICE O/P NEW MOD 45 MIN: CPT | Performed by: UROLOGY

## 2024-09-09 PROCEDURE — 81003 URINALYSIS AUTO W/O SCOPE: CPT | Performed by: UROLOGY

## 2024-09-09 PROCEDURE — 1036F TOBACCO NON-USER: CPT | Performed by: UROLOGY

## 2024-09-09 PROCEDURE — 87086 URINE CULTURE/COLONY COUNT: CPT

## 2024-09-09 ASSESSMENT — PAIN SCALES - GENERAL: PAINLEVEL: 6

## 2024-09-09 NOTE — PROGRESS NOTES
"  Patient is a 59 y.o. female presenting with microscopic hematuria.    SUBJECTIVE:  HPI   She has a history of microscopic hematuria and urine to urinalysis studies.  She has not seen any gross hematuria.  She has not had any kidney stones.  She feels well today.      No results found for: \"URINECULTURE\"     Past Medical History:   Diagnosis Date    Acute bronchitis due to other specified organisms 09/20/2021    Acute bronchitis due to other specified organisms    Acute low back pain 03/13/2023    Acute maxillary sinusitis, unspecified 11/15/2019    Acute maxillary sinusitis    Acute nasopharyngitis (common cold) 11/14/2019    Nasopharyngitis    Acute right-sided low back pain with left-sided sciatica 03/13/2023    Cervical radicular pain 03/13/2023    Chest pain 03/13/2023    Headache, unspecified 06/24/2020    Intractable headache    Infrapatellar bursitis of left knee 03/13/2023    Leg pain, bilateral 03/13/2023    Neoplasm of uncertain behavior of skin of upper extremity 03/13/2023    Other acute sinusitis 02/02/2022    Other acute sinusitis, recurrence not specified    Other psychoactive substance use, 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) 06/20/2019    Drug-induced insomnia    Otitis media, unspecified, right ear 03/24/2018    Acute right otitis media    Personal history of other diseases of the respiratory system 11/23/2020    History of acute sinusitis    Personal history of other diseases of the respiratory system 11/04/2019    History of sinusitis    Personal history of other diseases of the respiratory system 02/15/2019    History of acute sinusitis    Personal history of other infectious and parasitic diseases 11/12/2020    History of viral infection    Personal history of other specified conditions 03/10/2020    History of epigastric pain    Personal history of other specified conditions 11/25/2019    History of epigastric pain    Skin lesion of left upper extremity " 03/13/2023    Voice disturbance 03/13/2023    Weakness 03/13/2023      Past Surgical History:   Procedure Laterality Date    CERVICAL FUSION  05/20/2015    Cervical Vertebral Fusion    CHOLECYSTECTOMY  05/20/2015    Cholecystectomy    CT ABDOMEN PELVIS ANGIOGRAM W AND/OR WO IV CONTRAST  9/13/2023    CT ABDOMEN PELVIS ANGIOGRAM W AND/OR WO IV CONTRAST 9/13/2023 [id number] CT    HYSTERECTOMY  05/20/2015    Hysterectomy    MR HEAD ANGIO WO IV CONTRAST  10/9/2019    MR HEAD ANGIO WO IV CONTRAST 10/9/2019 [facility]      Family History   Problem Relation Name Age of Onset    Breast cancer Mother's Sister  60      Social History     Socioeconomic History    Marital status:    Tobacco Use    Smoking status: Never    Smokeless tobacco: Never   Substance and Sexual Activity    Alcohol use: Never    Drug use: Never        Review of Systems   Constitutional: denies any unintentional weight loss or change in strength.  Integumentary: denies any rashes or pruritus.  Eyes: denies any double vision or eye pain.  Ear/Nose/Mouth/Throat: denies any nosebleeds or gum bleeds.  Cardiovascular: denies any chest pain or syncope.  Respiratory: denies hemoptysis.  Gastrointestinal: denies nausea or vomiting.  Musculoskeletal: denies muscle cramping or weakness.  Neurologic: denies convulsions or seizures.  Hematologic/Lymphatic: denies bleeding tendencies.  Endocrine: denies heat/cold intolerance.  All other systems have been reviewed and are negative unless otherwise noted in the HPI.    OBJECTIVE:  There were no vitals taken for this visit.  Physical Exam   Constitutional: No obvious distress.  Eyes: Non-injected conjunctiva, sclera clear, EOMI.  Ears/Nose/Mouth/Throat: No obvious drainage per ears or nose.  Cardiovascular: Extremities are warm and well perfused. No edema, cyanosis or pallor.  Respiratory: No audible wheezing/stridor; respirations do not appear labored.  Gastrointestinal: Abdomen soft, not  "distended.  Musculoskeletal: Normal ROM of extremities.  Skin: No obvious rashes or open sores.  Neurologic: Alert and oriented, CN 2-12 grossly intact.  Psychiatric: Answers questions appropriately with normal affect.  Hematologic/Lymphatic/Immunologic: No obvious bruises or sites of spontaneous bleeding.  Genitourinary: No CVA tenderness, bladder not palpable.     Labs:  Lab Results   Component Value Date    WBC 7.7 07/23/2024    HGB 14.1 07/23/2024    HCT 40.6 07/23/2024     07/23/2024    CHOL 231 (H) 06/19/2024    TRIG 126 06/19/2024    HDL 58.1 06/19/2024    ALT 17 07/23/2024    AST 18 07/23/2024     07/23/2024    K 3.6 07/23/2024    CL 99 07/23/2024    CREATININE 0.77 07/23/2024    BUN 11 07/23/2024    CO2 32 07/23/2024    TSH 1.64 06/19/2024    HGBA1C 5.6 06/19/2024     No results found for: \"KPSAT\", \"KPSAP\"  IMAGING:        PROCEDURES:    ASSESSMENT/PLAN:  Problem List Items Addressed This Visit       Asymptomatic microscopic hematuria      She has a history of microscopic hematuria on urine dip x 2.  Her urinalysis today had trace blood and was sent for microscopy, culture and sensitivity.    If her urinalysis performed for RBCs on microscopy, a CT urogram followed by cystoscopy has been recommended.  We will discuss results.    All questions were answered to the patient’s satisfaction.  Patient agrees with the plan and wishes to proceed.  Follow-up will be scheduled appropriately.     Bhavin Greene MD  "

## 2024-09-10 LAB
BACTERIA UR CULT: NO GROWTH
MUCOUS THREADS #/AREA URNS AUTO: NORMAL /LPF
RBC #/AREA URNS AUTO: NORMAL /HPF
WBC #/AREA URNS AUTO: NORMAL /HPF

## 2024-09-12 ENCOUNTER — OFFICE VISIT (OUTPATIENT)
Dept: PRIMARY CARE | Facility: CLINIC | Age: 59
End: 2024-09-12
Payer: MEDICARE

## 2024-09-12 VITALS
WEIGHT: 177.8 LBS | SYSTOLIC BLOOD PRESSURE: 122 MMHG | BODY MASS INDEX: 31.5 KG/M2 | HEART RATE: 71 BPM | DIASTOLIC BLOOD PRESSURE: 78 MMHG | OXYGEN SATURATION: 98 %

## 2024-09-12 DIAGNOSIS — M54.41 ACUTE BILATERAL LOW BACK PAIN WITH BILATERAL SCIATICA: Primary | ICD-10-CM

## 2024-09-12 DIAGNOSIS — M54.42 ACUTE BILATERAL LOW BACK PAIN WITH BILATERAL SCIATICA: Primary | ICD-10-CM

## 2024-09-12 PROCEDURE — 1036F TOBACCO NON-USER: CPT | Performed by: FAMILY MEDICINE

## 2024-09-12 PROCEDURE — 3078F DIAST BP <80 MM HG: CPT | Performed by: FAMILY MEDICINE

## 2024-09-12 PROCEDURE — 99213 OFFICE O/P EST LOW 20 MIN: CPT | Performed by: FAMILY MEDICINE

## 2024-09-12 PROCEDURE — 3074F SYST BP LT 130 MM HG: CPT | Performed by: FAMILY MEDICINE

## 2024-09-12 RX ORDER — BACLOFEN 10 MG/1
10 TABLET ORAL 2 TIMES DAILY
Qty: 60 TABLET | Refills: 5 | Status: SHIPPED | OUTPATIENT
Start: 2024-09-12 | End: 2025-03-11

## 2024-09-12 RX ORDER — OXYCODONE AND ACETAMINOPHEN 5; 325 MG/1; MG/1
1 TABLET ORAL EVERY 6 HOURS PRN
Qty: 15 TABLET | Refills: 0 | Status: SHIPPED | OUTPATIENT
Start: 2024-09-12 | End: 2024-09-19

## 2024-09-12 RX ORDER — NAPROXEN 500 MG/1
500 TABLET ORAL 2 TIMES DAILY PRN
Qty: 60 TABLET | Refills: 0 | Status: SHIPPED | OUTPATIENT
Start: 2024-09-12 | End: 2024-12-11

## 2024-09-12 ASSESSMENT — COLUMBIA-SUICIDE SEVERITY RATING SCALE - C-SSRS
1. IN THE PAST MONTH, HAVE YOU WISHED YOU WERE DEAD OR WISHED YOU COULD GO TO SLEEP AND NOT WAKE UP?: NO
2. HAVE YOU ACTUALLY HAD ANY THOUGHTS OF KILLING YOURSELF?: NO
6. HAVE YOU EVER DONE ANYTHING, STARTED TO DO ANYTHING, OR PREPARED TO DO ANYTHING TO END YOUR LIFE?: NO

## 2024-09-12 ASSESSMENT — PATIENT HEALTH QUESTIONNAIRE - PHQ9
2. FEELING DOWN, DEPRESSED OR HOPELESS: NOT AT ALL
1. LITTLE INTEREST OR PLEASURE IN DOING THINGS: NOT AT ALL
SUM OF ALL RESPONSES TO PHQ9 QUESTIONS 1 AND 2: 0

## 2024-09-12 NOTE — PROGRESS NOTES
Subjective   Patient ID: Neema Mckeon is a 59 y.o. female who presents for Back Pain.  HPI  Having pain in low back for over 1 week  Sharp pain, 14/10  Worse- sitting for long periods, walking too long  Better- nothing  B/L lower lumbars  Some pain radiating both legs on the lateral thigh to knee  A few urinary incontinence episodes  No GI incontinence  No fever, chills  No unexplained weight loss    Has been on percocet  Using heat    Current Outpatient Medications:     DULoxetine (Cymbalta) 30 mg DR capsule, TAKE ONE CAPSULE BY MOUTH EVERY DAY. DO NOT CRUSH OR CHEW., Disp: 90 capsule, Rfl: 0    losartan-hydrochlorothiazide (Hyzaar) 50-12.5 mg tablet, Take 2 tablets by mouth once daily., Disp: , Rfl:     metoprolol succinate XL (Toprol-XL) 50 mg 24 hr tablet, Take 1 tablet (50 mg) by mouth once daily. Do not crush or chew., Disp: 90 tablet, Rfl: 3    SUMAtriptan (Imitrex) 100 mg tablet, Take 1 tablet (100 mg) by mouth 1 time if needed for migraine., Disp: 9 tablet, Rfl: 5    traZODone (Desyrel) 50 mg tablet, TAKE 1 TO 3 TABLETS BY MOUTH AT BEDTIME AS NEEDED FOR SLEEP, Disp: 180 tablet, Rfl: 0    baclofen (Lioresal) 10 mg tablet, Take 1 tablet (10 mg) by mouth 2 times a day., Disp: 60 tablet, Rfl: 5    naproxen (Naprosyn) 500 mg tablet, Take 1 tablet (500 mg) by mouth 2 times a day as needed for mild pain (1 - 3) (pain)., Disp: 60 tablet, Rfl: 0    oxyCODONE-acetaminophen (Percocet) 5-325 mg tablet, Take 1 tablet by mouth every 6 hours if needed for severe pain (7 - 10) for up to 7 days., Disp: 15 tablet, Rfl: 0   Past Surgical History:   Procedure Laterality Date    CERVICAL FUSION  05/20/2015    Cervical Vertebral Fusion    CHOLECYSTECTOMY  05/20/2015    Cholecystectomy    CT ABDOMEN PELVIS ANGIOGRAM W AND/OR WO IV CONTRAST  9/13/2023    CT ABDOMEN PELVIS ANGIOGRAM W AND/OR WO IV CONTRAST 9/13/2023 [id number] CT    HYSTERECTOMY  05/20/2015    Hysterectomy    MR HEAD ANGIO WO IV CONTRAST  10/9/2019    MR HEAD  ANGIO WO IV CONTRAST 10/9/2019 [facility]      Past Medical History:   Diagnosis Date    Acute bronchitis due to other specified organisms 09/20/2021    Acute bronchitis due to other specified organisms    Acute low back pain 03/13/2023    Acute maxillary sinusitis, unspecified 11/15/2019    Acute maxillary sinusitis    Acute nasopharyngitis (common cold) 11/14/2019    Nasopharyngitis    Acute right-sided low back pain with left-sided sciatica 03/13/2023    Cervical radicular pain 03/13/2023    Chest pain 03/13/2023    Headache, unspecified 06/24/2020    Intractable headache    Infrapatellar bursitis of left knee 03/13/2023    Leg pain, bilateral 03/13/2023    Neoplasm of uncertain behavior of skin of upper extremity 03/13/2023    Other acute sinusitis 02/02/2022    Other acute sinusitis, recurrence not specified    Other psychoactive substance use, 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) 06/20/2019    Drug-induced insomnia    Otitis media, unspecified, right ear 03/24/2018    Acute right otitis media    Personal history of other diseases of the respiratory system 11/23/2020    History of acute sinusitis    Personal history of other diseases of the respiratory system 11/04/2019    History of sinusitis    Personal history of other diseases of the respiratory system 02/15/2019    History of acute sinusitis    Personal history of other infectious and parasitic diseases 11/12/2020    History of viral infection    Personal history of other specified conditions 03/10/2020    History of epigastric pain    Personal history of other specified conditions 11/25/2019    History of epigastric pain    Skin lesion of left upper extremity 03/13/2023    Voice disturbance 03/13/2023    Weakness 03/13/2023     Social History     Tobacco Use    Smoking status: Never    Smokeless tobacco: Never   Substance Use Topics    Alcohol use: Never    Drug use: Never      Family History   Problem Relation  Name Age of Onset    Breast cancer Mother's Sister  60      Review of Systems    Objective   /78   Pulse 71   Wt 80.6 kg (177 lb 12.8 oz)   SpO2 98%   BMI 31.50 kg/m²    Physical Exam  Vitals and nursing note reviewed.   Cardiovascular:      Rate and Rhythm: Normal rate and regular rhythm.      Pulses: Normal pulses.      Heart sounds: Normal heart sounds.   Pulmonary:      Effort: Pulmonary effort is normal.      Breath sounds: Normal breath sounds.   Musculoskeletal:      Comments: TTP in lower lumbars B/L with increased muscle tone, decreased flexion  Negative SLR B/L   Skin:     Capillary Refill: Capillary refill takes less than 2 seconds.   Neurological:      General: No focal deficit present.      Mental Status: She is oriented to person, place, and time.      Sensory: No sensory deficit.      Motor: No weakness.      Deep Tendon Reflexes: Reflexes normal.   Psychiatric:         Mood and Affect: Mood normal.         Behavior: Behavior normal.         Assessment/Plan   Problem List Items Addressed This Visit    None  Visit Diagnoses       Acute bilateral low back pain with bilateral sciatica    -  Primary    Relevant Medications    baclofen (Lioresal) 10 mg tablet    naproxen (Naprosyn) 500 mg tablet    oxyCODONE-acetaminophen (Percocet) 5-325 mg tablet    Other Relevant Orders    Referral to Physical Therapy        Heat 20 minutes tid    Patient understands and agrees with treatment plan    Kirill Gil DO

## 2024-09-25 ENCOUNTER — TELEPHONE (OUTPATIENT)
Dept: PRIMARY CARE | Facility: CLINIC | Age: 59
End: 2024-09-25
Payer: MEDICARE

## 2024-09-25 DIAGNOSIS — J01.40 ACUTE NON-RECURRENT PANSINUSITIS: ICD-10-CM

## 2024-09-25 RX ORDER — AMOXICILLIN AND CLAVULANATE POTASSIUM 875; 125 MG/1; MG/1
875 TABLET, FILM COATED ORAL 2 TIMES DAILY
Qty: 20 TABLET | Refills: 0 | Status: SHIPPED | OUTPATIENT
Start: 2024-09-25 | End: 2024-10-05

## 2024-10-12 DIAGNOSIS — I10 PRIMARY HYPERTENSION: ICD-10-CM

## 2024-10-14 RX ORDER — LOSARTAN POTASSIUM AND HYDROCHLOROTHIAZIDE 12.5; 1 MG/1; MG/1
1 TABLET ORAL DAILY
Qty: 90 TABLET | Refills: 0 | Status: SHIPPED | OUTPATIENT
Start: 2024-10-14

## 2024-10-23 ENCOUNTER — TELEPHONE (OUTPATIENT)
Dept: PRIMARY CARE | Facility: CLINIC | Age: 59
End: 2024-10-23
Payer: MEDICARE

## 2024-10-23 DIAGNOSIS — M54.42 ACUTE BILATERAL LOW BACK PAIN WITH BILATERAL SCIATICA: ICD-10-CM

## 2024-10-23 DIAGNOSIS — M54.41 ACUTE BILATERAL LOW BACK PAIN WITH BILATERAL SCIATICA: ICD-10-CM

## 2024-10-23 DIAGNOSIS — F51.01 IDIOPATHIC INSOMNIA: ICD-10-CM

## 2024-10-23 RX ORDER — TRAZODONE HYDROCHLORIDE 50 MG/1
50-150 TABLET ORAL NIGHTLY PRN
Qty: 180 TABLET | Refills: 0 | Status: SHIPPED | OUTPATIENT
Start: 2024-10-23

## 2024-10-23 RX ORDER — OXYCODONE AND ACETAMINOPHEN 5; 325 MG/1; MG/1
1 TABLET ORAL EVERY 6 HOURS PRN
Qty: 15 TABLET | Refills: 0 | Status: SHIPPED | OUTPATIENT
Start: 2024-10-23 | End: 2024-10-30

## 2024-11-25 ENCOUNTER — TELEPHONE (OUTPATIENT)
Dept: PRIMARY CARE | Facility: CLINIC | Age: 59
End: 2024-11-25
Payer: MEDICARE

## 2024-11-25 DIAGNOSIS — M54.42 ACUTE BILATERAL LOW BACK PAIN WITH BILATERAL SCIATICA: ICD-10-CM

## 2024-11-25 DIAGNOSIS — M54.41 ACUTE BILATERAL LOW BACK PAIN WITH BILATERAL SCIATICA: ICD-10-CM

## 2024-11-25 RX ORDER — OXYCODONE AND ACETAMINOPHEN 5; 325 MG/1; MG/1
1 TABLET ORAL EVERY 6 HOURS PRN
Qty: 15 TABLET | Refills: 0 | Status: SHIPPED | OUTPATIENT
Start: 2024-11-25 | End: 2024-12-02

## 2024-12-13 DIAGNOSIS — F33.1 MDD (MAJOR DEPRESSIVE DISORDER), RECURRENT EPISODE, MODERATE: ICD-10-CM

## 2024-12-13 RX ORDER — DULOXETIN HYDROCHLORIDE 30 MG/1
30 CAPSULE, DELAYED RELEASE ORAL DAILY
Qty: 90 CAPSULE | Refills: 0 | Status: SHIPPED | OUTPATIENT
Start: 2024-12-13

## 2024-12-16 ENCOUNTER — TELEPHONE (OUTPATIENT)
Dept: PRIMARY CARE | Facility: CLINIC | Age: 59
End: 2024-12-16
Payer: MEDICARE

## 2024-12-16 DIAGNOSIS — J01.80 ACUTE NON-RECURRENT SINUSITIS OF OTHER SINUS: Primary | ICD-10-CM

## 2024-12-16 RX ORDER — AZITHROMYCIN 250 MG/1
TABLET, FILM COATED ORAL
Qty: 6 TABLET | Refills: 0 | Status: SHIPPED | OUTPATIENT
Start: 2024-12-16 | End: 2024-12-21

## 2024-12-30 ENCOUNTER — TELEPHONE (OUTPATIENT)
Dept: PRIMARY CARE | Facility: CLINIC | Age: 59
End: 2024-12-30
Payer: MEDICARE

## 2024-12-30 NOTE — TELEPHONE ENCOUNTER
She needs an mri done of her back. The back dr will not write for her to be sedated wants to know if you will put order in

## 2024-12-30 NOTE — TELEPHONE ENCOUNTER
That really needs to be done by him/her because they would have all the information to get the MRI approved where I would not have that information

## 2025-01-08 ENCOUNTER — TELEPHONE (OUTPATIENT)
Dept: PRIMARY CARE | Facility: CLINIC | Age: 60
End: 2025-01-08
Payer: MEDICARE

## 2025-01-08 DIAGNOSIS — M54.42 ACUTE BILATERAL LOW BACK PAIN WITH BILATERAL SCIATICA: ICD-10-CM

## 2025-01-08 DIAGNOSIS — M54.41 ACUTE BILATERAL LOW BACK PAIN WITH BILATERAL SCIATICA: ICD-10-CM

## 2025-01-08 RX ORDER — OXYCODONE AND ACETAMINOPHEN 5; 325 MG/1; MG/1
1 TABLET ORAL EVERY 6 HOURS PRN
Qty: 15 TABLET | Refills: 0 | Status: SHIPPED | OUTPATIENT
Start: 2025-01-08 | End: 2025-01-15

## 2025-01-08 NOTE — TELEPHONE ENCOUNTER
[id number] [facility] ED  [address] [facility] [address]  Phone: [phone]  Fax: Farheen Macdonald [address]      Pt Name: Beatris Evans  MRN: [id number]  [place] Percocet for her back to [place]   Farheen Correa [address]  [facility] [place] [phone]    [phone]

## 2025-01-10 NOTE — PROGRESS NOTES
Patient is a 59 y.o. female presenting with microscopic hematuria.    SUBJECTIVE:  HPI   She has a history of microscopic hematuria. Microscopy on September 9, 2024 had 1-2 RBC/HPF. She denies gross hematuria or dysuria. She has no history of kidney stones.       Urine Culture (no units)   Date Value   09/09/2024 No growth        Past Medical History:   Diagnosis Date    Acute bronchitis due to other specified organisms 09/20/2021    Acute bronchitis due to other specified organisms    Acute low back pain 03/13/2023    Acute maxillary sinusitis, unspecified 11/15/2019    Acute maxillary sinusitis    Acute nasopharyngitis (common cold) 11/14/2019    Nasopharyngitis    Acute right-sided low back pain with left-sided sciatica 03/13/2023    Cervical radicular pain 03/13/2023    Chest pain 03/13/2023    Headache, unspecified 06/24/2020    Intractable headache    Infrapatellar bursitis of left knee 03/13/2023    Leg pain, bilateral 03/13/2023    Neoplasm of uncertain behavior of skin of upper extremity 03/13/2023    Other acute sinusitis 02/02/2022    Other acute sinusitis, recurrence not specified    Other psychoactive substance use, 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    Drug-induced insomnia    Otitis media, unspecified, right ear 03/24/2018    Acute right otitis media    Personal history of other diseases of the respiratory system 11/23/2020    History of acute sinusitis    Personal history of other diseases of the respiratory system 11/04/2019    History of sinusitis    Personal history of other diseases of the respiratory system 02/15/2019    History of acute sinusitis    Personal history of other infectious and parasitic diseases 11/12/2020    History of viral infection    Personal history of other specified conditions 03/10/2020    History of epigastric pain    Personal history of other specified conditions 11/25/2019    History of epigastric pain    Skin lesion of left  upper extremity 03/13/2023    Voice disturbance 03/13/2023    Weakness 03/13/2023      Past Surgical History:   Procedure Laterality Date    CERVICAL FUSION  05/20/2015    Cervical Vertebral Fusion    CHOLECYSTECTOMY  05/20/2015    Cholecystectomy    CT ABDOMEN PELVIS ANGIOGRAM W AND/OR WO IV CONTRAST  9/13/2023    CT ABDOMEN PELVIS ANGIOGRAM W AND/OR WO IV CONTRAST 9/13/2023 [id number] CT    HYSTERECTOMY  05/20/2015    Hysterectomy    MR HEAD ANGIO WO IV CONTRAST  10/9/2019    MR HEAD ANGIO WO IV CONTRAST 10/9/2019 [facility]      Family History   Problem Relation Name Age of Onset    Breast cancer Mother's Sister  60      Social History     Socioeconomic History    Marital status:    Tobacco Use    Smoking status: Never    Smokeless tobacco: Never   Substance and Sexual Activity    Alcohol use: Never    Drug use: Never        Review of Systems   Constitutional: denies any unintentional weight loss or change in strength.  Integumentary: denies any rashes or pruritus.  Eyes: denies any double vision or eye pain.  Ear/Nose/Mouth/Throat: denies any nosebleeds or gum bleeds.  Cardiovascular: denies any chest pain or syncope.  Respiratory: denies hemoptysis.  Gastrointestinal: denies nausea or vomiting.  Musculoskeletal: denies muscle cramping or weakness.  Neurologic: denies convulsions or seizures.  Hematologic/Lymphatic: denies bleeding tendencies.  Endocrine: denies heat/cold intolerance.  All other systems have been reviewed and are negative unless otherwise noted in the HPI.    OBJECTIVE:  Visit Vitals  Temp 37 °C (98.6 °F)     Physical Exam   Constitutional: No obvious distress.  Eyes: Non-injected conjunctiva, sclera clear, EOMI.  Ears/Nose/Mouth/Throat: No obvious drainage per ears or nose.  Cardiovascular: Extremities are warm and well perfused. No edema, cyanosis or pallor.  Respiratory: No audible wheezing/stridor; respirations do not appear labored.  Gastrointestinal: Abdomen soft, not  "distended.  Musculoskeletal: Normal ROM of extremities.  Skin: No obvious rashes or open sores.  Neurologic: Alert and oriented, CN 2-12 grossly intact.  Psychiatric: Answers questions appropriately with normal affect.  Hematologic/Lymphatic/Immunologic: No obvious bruises or sites of spontaneous bleeding.  Genitourinary: No CVA tenderness, bladder not palpable.     Labs:  Lab Results   Component Value Date    WBC 7.7 07/23/2024    HGB 14.1 07/23/2024    HCT 40.6 07/23/2024     07/23/2024    CHOL 231 (H) 06/19/2024    TRIG 126 06/19/2024    HDL 58.1 06/19/2024    ALT 17 07/23/2024    AST 18 07/23/2024     07/23/2024    K 3.6 07/23/2024    CL 99 07/23/2024    CREATININE 0.77 07/23/2024    BUN 11 07/23/2024    CO2 32 07/23/2024    TSH 1.64 06/19/2024    HGBA1C 5.6 06/19/2024     No results found for: \"KPSAT\", \"KPSAP\"  IMAGING:      PROCEDURES:    ASSESSMENT/PLAN:  Problem List Items Addressed This Visit       Microscopic hematuria    Relevant Orders    POCT UA Automated manually resulted (Completed)    Urine Culture    Microscopic Only, Urine     She has a history of microscopic hematuria on urine dip x 2.      Her urine from 9/9/2024 had 1-2 RBC/HPF on microscopy. UA today has 1+ blood and 1+ leukocytes today (1/13/2025) and and will be sent for microscopy, culture, and sensitivity. If microscopy shows increased red cells, we discussed evaluation with cystoscopy.     She will follow up in 6 months.    All questions were answered to the patient’s satisfaction.  Patient agrees with the plan and wishes to proceed.  Follow-up will be scheduled appropriately.     Scribe Attestation  By signing my name below, I, Chris Escobedo,   attest that this documentation has been prepared under the direction and in the presence of Bhavin Greene MD.     "

## 2025-01-11 DIAGNOSIS — I10 PRIMARY HYPERTENSION: ICD-10-CM

## 2025-01-11 DIAGNOSIS — F51.01 IDIOPATHIC INSOMNIA: ICD-10-CM

## 2025-01-13 ENCOUNTER — APPOINTMENT (OUTPATIENT)
Dept: UROLOGY | Facility: CLINIC | Age: 60
End: 2025-01-13
Payer: MEDICARE

## 2025-01-13 VITALS — TEMPERATURE: 98.6 F

## 2025-01-13 DIAGNOSIS — R31.29 MICROSCOPIC HEMATURIA: ICD-10-CM

## 2025-01-13 LAB
POC APPEARANCE, URINE: CLEAR
POC BILIRUBIN, URINE: NEGATIVE
POC BLOOD, URINE: ABNORMAL
POC COLOR, URINE: YELLOW
POC GLUCOSE, URINE: NEGATIVE MG/DL
POC KETONES, URINE: NEGATIVE MG/DL
POC LEUKOCYTES, URINE: ABNORMAL
POC NITRITE,URINE: NEGATIVE
POC PH, URINE: 7 PH
POC PROTEIN, URINE: NEGATIVE MG/DL
POC SPECIFIC GRAVITY, URINE: 1.02
POC UROBILINOGEN, URINE: 1 EU/DL

## 2025-01-13 PROCEDURE — 87086 URINE CULTURE/COLONY COUNT: CPT

## 2025-01-13 PROCEDURE — 1036F TOBACCO NON-USER: CPT | Performed by: UROLOGY

## 2025-01-13 PROCEDURE — 81003 URINALYSIS AUTO W/O SCOPE: CPT | Performed by: UROLOGY

## 2025-01-13 PROCEDURE — G2211 COMPLEX E/M VISIT ADD ON: HCPCS | Performed by: UROLOGY

## 2025-01-13 PROCEDURE — 99213 OFFICE O/P EST LOW 20 MIN: CPT | Performed by: UROLOGY

## 2025-01-13 PROCEDURE — 81001 URINALYSIS AUTO W/SCOPE: CPT

## 2025-01-13 RX ORDER — TRAZODONE HYDROCHLORIDE 50 MG/1
50-150 TABLET ORAL NIGHTLY PRN
Qty: 180 TABLET | Refills: 1 | Status: SHIPPED | OUTPATIENT
Start: 2025-01-13

## 2025-01-13 RX ORDER — LOSARTAN POTASSIUM AND HYDROCHLOROTHIAZIDE 12.5; 1 MG/1; MG/1
1 TABLET ORAL DAILY
Qty: 90 TABLET | Refills: 1 | Status: SHIPPED | OUTPATIENT
Start: 2025-01-13

## 2025-01-13 ASSESSMENT — PAIN SCALES - GENERAL: PAINLEVEL_OUTOF10: 0-NO PAIN

## 2025-01-13 NOTE — PROGRESS NOTES
Subjective   Patient ID: Neema Mckeon is a 59 y.o. female who presents for No chief complaint on file..  HPI    Review of Systems    Objective   Physical Exam    Assessment/Plan            Kirill Gil DO 01/13/25 7:47 AM

## 2025-01-14 LAB
MUCOUS THREADS #/AREA URNS AUTO: NORMAL /LPF
RBC #/AREA URNS AUTO: NORMAL /HPF
SQUAMOUS #/AREA URNS AUTO: NORMAL /HPF
WBC #/AREA URNS AUTO: NORMAL /HPF

## 2025-01-15 LAB — BACTERIA UR CULT: NORMAL

## 2025-01-27 ENCOUNTER — TELEPHONE (OUTPATIENT)
Dept: CARDIOLOGY | Facility: HOSPITAL | Age: 60
End: 2025-01-27
Payer: MEDICARE

## 2025-01-28 ENCOUNTER — TELEPHONE (OUTPATIENT)
Dept: PRIMARY CARE | Facility: CLINIC | Age: 60
End: 2025-01-28
Payer: MEDICARE

## 2025-01-28 DIAGNOSIS — J01.80 ACUTE NON-RECURRENT SINUSITIS OF OTHER SINUS: ICD-10-CM

## 2025-01-28 RX ORDER — AMOXICILLIN AND CLAVULANATE POTASSIUM 875; 125 MG/1; MG/1
875 TABLET, FILM COATED ORAL 2 TIMES DAILY
Qty: 20 TABLET | Refills: 0 | Status: SHIPPED | OUTPATIENT
Start: 2025-01-28 | End: 2025-02-07

## 2025-02-04 ENCOUNTER — TELEPHONE (OUTPATIENT)
Dept: PRIMARY CARE | Facility: CLINIC | Age: 60
End: 2025-02-04
Payer: MEDICARE

## 2025-02-04 DIAGNOSIS — J01.01 ACUTE RECURRENT MAXILLARY SINUSITIS: Primary | ICD-10-CM

## 2025-02-04 RX ORDER — AZITHROMYCIN 250 MG/1
TABLET, FILM COATED ORAL
Qty: 6 TABLET | Refills: 0 | Status: SHIPPED | OUTPATIENT
Start: 2025-02-04

## 2025-02-18 ENCOUNTER — OFFICE VISIT (OUTPATIENT)
Dept: CARDIOLOGY | Facility: HOSPITAL | Age: 60
End: 2025-02-18
Payer: MEDICARE

## 2025-02-18 VITALS
OXYGEN SATURATION: 97 % | DIASTOLIC BLOOD PRESSURE: 78 MMHG | WEIGHT: 179.9 LBS | BODY MASS INDEX: 31.87 KG/M2 | SYSTOLIC BLOOD PRESSURE: 121 MMHG | HEART RATE: 71 BPM

## 2025-02-18 DIAGNOSIS — Z01.818 PRE-OPERATIVE CLEARANCE: ICD-10-CM

## 2025-02-18 PROCEDURE — 99214 OFFICE O/P EST MOD 30 MIN: CPT | Performed by: NURSE PRACTITIONER

## 2025-02-18 PROCEDURE — 3078F DIAST BP <80 MM HG: CPT | Performed by: NURSE PRACTITIONER

## 2025-02-18 PROCEDURE — 3074F SYST BP LT 130 MM HG: CPT | Performed by: NURSE PRACTITIONER

## 2025-02-18 PROCEDURE — 93005 ELECTROCARDIOGRAM TRACING: CPT | Performed by: NURSE PRACTITIONER

## 2025-02-18 NOTE — PROGRESS NOTES
Chief Complaint:   Pre-operative cardiac evaluation      History Of Present Illness:    Neema Mckeon is a 59 y.o. female with h/o multiple sclerosis, htn, sciatica, thrombocytopenia, cervical fusion presenting today for pre-operative cardiac evaluation prior to upcoming lumbar laminectomy surgery with Dr. Jones scheduled for 2/26/2025.  She is doing very well from a cardiac standpoint.  Denies symptoms of chest pain, SOB, or palpitations.  BP is well controlled today as well.      Last Recorded Vitals:  Vitals:    02/18/25 1009   BP: 121/78   Pulse: 71   SpO2: 97%   Weight: 81.6 kg (179 lb 14.3 oz)       Past Medical History:  She has a past medical history of Acute bronchitis due to other specified organisms (09/20/2021), Acute low back pain (03/13/2023), Acute maxillary sinusitis, unspecified (11/15/2019), Acute nasopharyngitis (common cold) (11/14/2019), Acute right-sided low back pain with left-sided sciatica (03/13/2023), Cervical radicular pain (03/13/2023), Chest pain (03/13/2023), Headache, unspecified (06/24/2020), Infrapatellar bursitis of left knee (03/13/2023), Leg pain, bilateral (03/13/2023), Neoplasm of uncertain behavior of skin of upper extremity (03/13/2023), Other acute sinusitis (02/02/2022), Other psychoactive substance use, 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    Past Surgical  History:  She has a past surgical history that includes Cervical fusion (05/20/2015); Cholecystectomy (05/20/2015); Hysterectomy (05/20/2015); MR angio head wo IV contrast (10/9/2019); and CT angio abdomen pelvis w and or wo IV IV contrast (9/13/2023).      Social History:  She reports that she has never smoked. She has never used smokeless tobacco. She reports that she does not drink alcohol and does not use drugs.    Family History:  Family History   Problem Relation Name Age of Onset    Breast cancer Mother's Sister  60        Allergies:  Cefaclor, Doxycycline, Ibuprofen, Levofloxacin, Morphine, Prednisone (bulk), and Sulfamethoxazole-trimethoprim    Outpatient Medications:  Current Outpatient Medications   Medication Instructions    azithromycin (Zithromax) 250 mg tablet Take 2 tabs (500 mg) by mouth today, than 1 daily for 4 days.    baclofen (LIORESAL) 10 mg, oral, 2 times daily    DULoxetine (CYMBALTA) 30 mg, oral, Daily, Do not crush or chew    losartan-hydrochlorothiazide (Hyzaar) 100-12.5 mg tablet 1 tablet, oral, Daily    metoprolol succinate XL (TOPROL-XL) 50 mg, oral, Daily, Do not crush or chew.    SUMAtriptan (IMITREX) 100 mg, oral, Once as needed    traZODone (DESYREL)  mg, oral, Nightly PRN       Physical Exam:  Constitutional: Pleasant, Awake/Alert/Oriented to person place and time. No distress  Head: Atraumatic, Normocephalic  Eyes: EOMI. JOSIANE  Neck:  No JVD  Cardiovascular: Regular rate and rhythm, S1, S2. No extra heart sounds or murmurs  Respiratory: Clear to auscultation bilaterally. No wheezing, rales or rhonchi.  Abdomen: Soft, Nontender. Bowel sounds appreciated  Musculoskeletal: ROM intact. Muscle strength grossly intact upper and lower extremities 5/5.   Neurological: CNII-XII intact. Sensation grossly intact  Extremities: Warm and dry. No acute rashes and lesions  Psychiatric: Appropriate mood and affect       Last Labs:  CBC -  Lab Results   Component Value Date    WBC 7.7  07/23/2024    HGB 14.1 07/23/2024    HCT 40.6 07/23/2024    MCV 90 07/23/2024     07/23/2024       CMP -  Lab Results   Component Value Date    CALCIUM 9.4 07/23/2024    PROT 6.6 07/23/2024    ALBUMIN 4.4 07/23/2024    AST 18 07/23/2024    ALT 17 07/23/2024    ALKPHOS 90 07/23/2024    BILITOT 0.7 07/23/2024       LIPID PANEL -   Lab Results   Component Value Date    CHOL 231 (H) 06/19/2024    TRIG 126 06/19/2024    HDL 58.1 06/19/2024    CHHDL 4.0 06/19/2024    LDLF 171 (H) 09/11/2023    VLDL 25 06/19/2024    NHDL 173 (H) 06/19/2024       RENAL FUNCTION PANEL -   Lab Results   Component Value Date    GLUCOSE 99 07/23/2024     07/23/2024    K 3.6 07/23/2024    CL 99 07/23/2024    CO2 32 07/23/2024    ANIONGAP 11 07/23/2024    BUN 11 07/23/2024    CREATININE 0.77 07/23/2024    CALCIUM 9.4 07/23/2024    ALBUMIN 4.4 07/23/2024        Lab Results   Component Value Date    BNP 19 07/23/2024    HGBA1C 5.6 06/19/2024       Last Cardiology Tests:  ECG:  ECG 12 lead 07/23/2024    Stress Test:  Nuclear Stress Test 08/22/2024  Impression   1. No evidence of inducible myocardial ischemia or prior infarction.  2. The left ventricle is normal in size.  3. Normal LV wall motion with a post-stress LV EF estimated greater  than 65%.    Summary:   1. Adequate level of stress achieved.   2. No clinical or electrocardiographic evidence for ischemia at a maximal workload.   3. Nuclear image results are reported separately.       Lab review: I have personally reviewed the laboratory result(s)   Diagnostic review: I have personally reviewed the result(s) of the stress test and ECG    Assessment/Plan   Very pleasant 59 y.o. female with h/o multiple sclerosis, htn, sciatica, thrombocytopenia, cervical fusion presenting today for pre-operative cardiac evaluation prior to upcoming lumbar laminectomy surgery with Dr. Jones scheduled for 2/26/2025.  She is doing very well from a cardiac standpoint.  She is able to achieve >4METS  without anginal symptoms.  Exercise nuclear stress test 8/2024 negative for stress induced ischemia, EF 65%.     Plan:  May proceed to the OR with low risk for perioperative cardiovascular event. RCRI score is 0 (3.9% risk of major cardiac event).  Continue current losartan-hydrochlorothiazide as well as metoprolol for BP control.        Ivet Upton, APRN-CNP

## 2025-03-04 DIAGNOSIS — F33.1 MDD (MAJOR DEPRESSIVE DISORDER), RECURRENT EPISODE, MODERATE: ICD-10-CM

## 2025-03-05 RX ORDER — DULOXETIN HYDROCHLORIDE 30 MG/1
30 CAPSULE, DELAYED RELEASE ORAL DAILY
Qty: 90 CAPSULE | Refills: 1 | Status: SHIPPED | OUTPATIENT
Start: 2025-03-05

## 2025-05-01 ENCOUNTER — TELEPHONE (OUTPATIENT)
Dept: PRIMARY CARE | Facility: CLINIC | Age: 60
End: 2025-05-01
Payer: MEDICARE

## 2025-05-01 DIAGNOSIS — M54.41 ACUTE BILATERAL LOW BACK PAIN WITH BILATERAL SCIATICA: ICD-10-CM

## 2025-05-01 DIAGNOSIS — M54.42 ACUTE BILATERAL LOW BACK PAIN WITH BILATERAL SCIATICA: ICD-10-CM

## 2025-05-01 RX ORDER — OXYCODONE AND ACETAMINOPHEN 5; 325 MG/1; MG/1
1 TABLET ORAL EVERY 6 HOURS PRN
Qty: 15 TABLET | Refills: 0 | Status: SHIPPED | OUTPATIENT
Start: 2025-05-01 | End: 2025-05-08

## 2025-05-01 NOTE — TELEPHONE ENCOUNTER
PT states she almost fell out of a chair yesterday and hurt her hip and wants you to call in pain meds.  ELIGIO Ahumada

## 2025-05-07 ENCOUNTER — OFFICE VISIT (OUTPATIENT)
Dept: PRIMARY CARE | Facility: CLINIC | Age: 60
End: 2025-05-07
Payer: MEDICARE

## 2025-05-07 VITALS
DIASTOLIC BLOOD PRESSURE: 73 MMHG | SYSTOLIC BLOOD PRESSURE: 147 MMHG | HEART RATE: 76 BPM | TEMPERATURE: 98.3 F | BODY MASS INDEX: 31.89 KG/M2 | OXYGEN SATURATION: 98 % | WEIGHT: 180 LBS

## 2025-05-07 DIAGNOSIS — J01.40 ACUTE NON-RECURRENT PANSINUSITIS: Primary | ICD-10-CM

## 2025-05-07 PROCEDURE — 99213 OFFICE O/P EST LOW 20 MIN: CPT

## 2025-05-07 PROCEDURE — 3078F DIAST BP <80 MM HG: CPT

## 2025-05-07 PROCEDURE — 1036F TOBACCO NON-USER: CPT

## 2025-05-07 PROCEDURE — 3077F SYST BP >= 140 MM HG: CPT

## 2025-05-07 RX ORDER — AZITHROMYCIN 250 MG/1
TABLET, FILM COATED ORAL
Qty: 6 TABLET | Refills: 0 | Status: SHIPPED | OUTPATIENT
Start: 2025-05-07 | End: 2025-05-12

## 2025-05-07 NOTE — PROGRESS NOTES
Subjective   Patient ID: Neema Mckeon is a 60 y.o. female who presents for Sinusitis (Sick for 3 wks).  HPI  Neema presents for sinus symptoms that started 3 weeks ago   -started with a cold, runny nose   -teeth and jaw hurt today, they hurt worse yesterday but are sore today  -sinus pressure under eyes and in forehead  -stuffed up nose   -sinus pressure headache   -no cough  -no sore throat, drainage has stopped  -no diarrhea   -has not tried anything over the counter  -no fever or chills   -son has been sick off and on       Surgical History[1]   Medical History[2]  Social History[3]     Review of Systems  10 point review of systems performed and is negative except as noted in the HPI.    Current Medications[4]     Objective   /73   Pulse 76   Temp 36.8 °C (98.3 °F)   Wt 81.6 kg (180 lb)   SpO2 98%   BMI 31.89 kg/m²     Physical Exam  Vitals reviewed.   Constitutional:       General: She is not in acute distress.     Appearance: Normal appearance. She is not toxic-appearing.   HENT:      Head: Normocephalic and atraumatic.      Right Ear: Tympanic membrane, ear canal and external ear normal.      Left Ear: Tympanic membrane, ear canal and external ear normal.      Nose: Congestion present.      Right Sinus: Maxillary sinus tenderness and frontal sinus tenderness present.      Left Sinus: Maxillary sinus tenderness and frontal sinus tenderness present.      Mouth/Throat:      Mouth: Mucous membranes are moist.      Pharynx: Oropharynx is clear. No oropharyngeal exudate or posterior oropharyngeal erythema.   Eyes:      Conjunctiva/sclera: Conjunctivae normal.      Pupils: Pupils are equal, round, and reactive to light.   Cardiovascular:      Rate and Rhythm: Normal rate and regular rhythm.      Heart sounds: Normal heart sounds.   Pulmonary:      Effort: Pulmonary effort is normal.      Breath sounds: Normal breath sounds. No wheezing, rhonchi or rales.   Lymphadenopathy:      Cervical: No cervical  adenopathy.   Skin:     General: Skin is warm and dry.   Neurological:      Mental Status: She is alert and oriented to person, place, and time.         Assessment & Plan  Acute non-recurrent pansinusitis  Start azithromycin   Increase fluids  Follow up if not improving   Orders:    azithromycin (Zithromax) 250 mg tablet; Take 2 tablets (500 mg) by mouth once daily for 1 day, THEN 1 tablet (250 mg) once daily for 4 days. Take 2 tabs (500 mg) by mouth today, than 1 daily for 4 days.          Discussed at visit any disease processes that were of concern as well as the risks, benefits and instructions on any new medication provided. Patient (and/or caretaker of patient if present) stated all questions were answered, and they voiced understanding of instructions.      Sarah Pierce PA-C       [1]   Past Surgical History:  Procedure Laterality Date    CERVICAL FUSION  05/20/2015    Cervical Vertebral Fusion    CHOLECYSTECTOMY  05/20/2015    Cholecystectomy    CT ABDOMEN PELVIS ANGIOGRAM W AND/OR WO IV CONTRAST  9/13/2023    CT ABDOMEN PELVIS ANGIOGRAM W AND/OR WO IV CONTRAST 9/13/2023 [id number] CT    HYSTERECTOMY  05/20/2015    Hysterectomy    MR HEAD ANGIO WO IV CONTRAST  10/9/2019    MR HEAD ANGIO WO IV CONTRAST 10/9/2019 [facility]   [2]   Past Medical History:  Diagnosis Date    Acute bronchitis due to other specified organisms 09/20/2021    Acute bronchitis due to other specified organisms    Acute low back pain 03/13/2023    Acute maxillary sinusitis, unspecified 11/15/2019    Acute maxillary sinusitis    Acute nasopharyngitis (common cold) 11/14/2019    Nasopharyngitis    Acute right-sided low back pain with left-sided sciatica 03/13/2023    Cervical radicular pain 03/13/2023    Chest pain 03/13/2023    Headache, unspecified 06/24/2020    Intractable headache    Infrapatellar bursitis of left knee 03/13/2023    Leg pain, bilateral 03/13/2023    Neoplasm of uncertain behavior of skin of upper extremity  03/13/2023    Other acute sinusitis 02/02/2022    Other acute sinusitis, recurrence not specified    Other psychoactive substance use, 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    Drug-induced insomnia    Otitis media, unspecified, right ear 03/24/2018    Acute right otitis media    Personal history of other diseases of the respiratory system 11/23/2020    History of acute sinusitis    Personal history of other diseases of the respiratory system 11/04/2019    History of sinusitis    Personal history of other diseases of the respiratory system 02/15/2019    History of acute sinusitis    Personal history of other infectious and parasitic diseases 11/12/2020    History of viral infection    Personal history of other specified conditions 03/10/2020    History of epigastric pain    Personal history of other specified conditions 11/25/2019    History of epigastric pain    Skin lesion of left upper extremity 03/13/2023    Voice disturbance 03/13/2023    Weakness 03/13/2023   [3]   Social History  Tobacco Use    Smoking status: Never    Smokeless tobacco: Never   Vaping Use    Vaping status: Never Used   Substance Use Topics    Alcohol use: Never    Drug use: Never   [4]   Current Outpatient Medications:     DULoxetine (Cymbalta) 30 mg DR capsule, TAKE ONE CAPSULE BY MOUTH EVERY DAY. DO NOT CRUSH OR CHEW., Disp: 90 capsule, Rfl: 1    losartan-hydrochlorothiazide (Hyzaar) 100-12.5 mg tablet, TAKE ONE TABLET BY MOUTH EVERY DAY, Disp: 90 tablet, Rfl: 1    metoprolol succinate XL (Toprol-XL) 50 mg 24 hr tablet, Take 1 tablet (50 mg) by mouth once daily. Do not crush or chew., Disp: 90 tablet, Rfl: 3    oxyCODONE-acetaminophen (Percocet) 5-325 mg tablet, Take 1 tablet by mouth every 6 hours if needed for severe pain (7 - 10) for up to 7 days., Disp: 15 tablet, Rfl: 0    traZODone (Desyrel) 50 mg tablet, TAKE 1 TO 3 TABLETS BY MOUTH AT BEDTIME AS NEEDED FOR SLEEP, Disp: 180 tablet, Rfl: 1     azithromycin (Zithromax) 250 mg tablet, Take 2 tabs (500 mg) by mouth today, than 1 daily for 4 days. (Patient not taking: Reported on 5/7/2025), Disp: 6 tablet, Rfl: 0    azithromycin (Zithromax) 250 mg tablet, Take 2 tablets (500 mg) by mouth once daily for 1 day, THEN 1 tablet (250 mg) once daily for 4 days. Take 2 tabs (500 mg) by mouth today, than 1 daily for 4 days., Disp: 6 tablet, Rfl: 0    baclofen (Lioresal) 10 mg tablet, Take 1 tablet (10 mg) by mouth 2 times a day. (Patient not taking: Reported on 5/7/2025), Disp: 60 tablet, Rfl: 5    SUMAtriptan (Imitrex) 100 mg tablet, Take 1 tablet (100 mg) by mouth 1 time if needed for migraine. (Patient not taking: Reported on 5/7/2025), Disp: 9 tablet, Rfl: 5

## 2025-05-19 ENCOUNTER — TELEPHONE (OUTPATIENT)
Dept: PRIMARY CARE | Facility: CLINIC | Age: 60
End: 2025-05-19
Payer: MEDICARE

## 2025-05-19 DIAGNOSIS — J01.40 ACUTE NON-RECURRENT PANSINUSITIS: ICD-10-CM

## 2025-05-19 RX ORDER — AMOXICILLIN AND CLAVULANATE POTASSIUM 875; 125 MG/1; MG/1
875 TABLET, FILM COATED ORAL 2 TIMES DAILY
Qty: 20 TABLET | Refills: 0 | Status: SHIPPED | OUTPATIENT
Start: 2025-05-19 | End: 2025-05-29

## 2025-06-16 DIAGNOSIS — J01.40 ACUTE NON-RECURRENT PANSINUSITIS: Primary | ICD-10-CM

## 2025-06-16 DIAGNOSIS — F51.01 IDIOPATHIC INSOMNIA: ICD-10-CM

## 2025-06-16 RX ORDER — TRAZODONE HYDROCHLORIDE 50 MG/1
50-150 TABLET ORAL NIGHTLY PRN
Qty: 180 TABLET | Refills: 1 | Status: SHIPPED | OUTPATIENT
Start: 2025-06-16

## 2025-06-16 RX ORDER — AZITHROMYCIN 250 MG/1
TABLET, FILM COATED ORAL
Qty: 6 TABLET | Refills: 0 | Status: SHIPPED | OUTPATIENT
Start: 2025-06-16 | End: 2025-06-21

## 2025-07-10 DIAGNOSIS — I10 PRIMARY HYPERTENSION: ICD-10-CM

## 2025-07-10 RX ORDER — LOSARTAN POTASSIUM AND HYDROCHLOROTHIAZIDE 12.5; 1 MG/1; MG/1
1 TABLET ORAL DAILY
Qty: 90 TABLET | Refills: 0 | Status: SHIPPED | OUTPATIENT
Start: 2025-07-10

## 2025-07-14 ENCOUNTER — APPOINTMENT (OUTPATIENT)
Dept: UROLOGY | Facility: CLINIC | Age: 60
End: 2025-07-14
Payer: MEDICARE

## 2025-07-18 ENCOUNTER — TELEPHONE (OUTPATIENT)
Dept: PRIMARY CARE | Facility: CLINIC | Age: 60
End: 2025-07-18
Payer: MEDICARE

## 2025-07-18 DIAGNOSIS — M54.42 ACUTE BILATERAL LOW BACK PAIN WITH BILATERAL SCIATICA: ICD-10-CM

## 2025-07-18 DIAGNOSIS — M54.41 ACUTE BILATERAL LOW BACK PAIN WITH BILATERAL SCIATICA: ICD-10-CM

## 2025-07-18 RX ORDER — OXYCODONE AND ACETAMINOPHEN 5; 325 MG/1; MG/1
1 TABLET ORAL EVERY 6 HOURS PRN
Qty: 15 TABLET | Refills: 0 | Status: SHIPPED | OUTPATIENT
Start: 2025-07-18 | End: 2025-07-25

## 2025-08-06 ENCOUNTER — TELEPHONE (OUTPATIENT)
Dept: PRIMARY CARE | Facility: CLINIC | Age: 60
End: 2025-08-06
Payer: MEDICARE

## 2025-08-06 DIAGNOSIS — J01.40 ACUTE NON-RECURRENT PANSINUSITIS: ICD-10-CM

## 2025-08-06 RX ORDER — AMOXICILLIN AND CLAVULANATE POTASSIUM 875; 125 MG/1; MG/1
875 TABLET, FILM COATED ORAL 2 TIMES DAILY
Qty: 20 TABLET | Refills: 0 | Status: SHIPPED | OUTPATIENT
Start: 2025-08-06 | End: 2025-08-16

## 2025-08-29 ENCOUNTER — TELEPHONE (OUTPATIENT)
Dept: PRIMARY CARE | Facility: CLINIC | Age: 60
End: 2025-08-29
Payer: MEDICARE

## 2025-08-29 DIAGNOSIS — M54.42 ACUTE BILATERAL LOW BACK PAIN WITH BILATERAL SCIATICA: ICD-10-CM

## 2025-08-29 DIAGNOSIS — M54.41 ACUTE BILATERAL LOW BACK PAIN WITH BILATERAL SCIATICA: ICD-10-CM

## 2025-08-29 DIAGNOSIS — J01.40 ACUTE NON-RECURRENT PANSINUSITIS: ICD-10-CM

## 2025-08-29 RX ORDER — AMOXICILLIN AND CLAVULANATE POTASSIUM 875; 125 MG/1; MG/1
875 TABLET, FILM COATED ORAL 2 TIMES DAILY
Qty: 20 TABLET | Refills: 0 | Status: SHIPPED | OUTPATIENT
Start: 2025-08-29 | End: 2025-09-08

## 2025-08-29 RX ORDER — OXYCODONE AND ACETAMINOPHEN 5; 325 MG/1; MG/1
1 TABLET ORAL EVERY 6 HOURS PRN
Qty: 15 TABLET | Refills: 0 | Status: SHIPPED | OUTPATIENT
Start: 2025-08-29 | End: 2025-09-05

## 2025-10-27 ENCOUNTER — APPOINTMENT (OUTPATIENT)
Dept: UROLOGY | Facility: CLINIC | Age: 60
End: 2025-10-27
Payer: MEDICARE